# Patient Record
Sex: MALE | Race: WHITE | Employment: OTHER | URBAN - METROPOLITAN AREA
[De-identification: names, ages, dates, MRNs, and addresses within clinical notes are randomized per-mention and may not be internally consistent; named-entity substitution may affect disease eponyms.]

---

## 2017-01-03 ENCOUNTER — TELEPHONE (OUTPATIENT)
Dept: FAMILY MEDICINE CLINIC | Age: 67
End: 2017-01-03

## 2017-01-03 RX ORDER — AZITHROMYCIN 250 MG/1
TABLET, FILM COATED ORAL
Qty: 1 PACKET | Refills: 0 | Status: SHIPPED | OUTPATIENT
Start: 2017-01-03 | End: 2017-01-13

## 2017-01-12 ENCOUNTER — PROCEDURE VISIT (OUTPATIENT)
Dept: FAMILY MEDICINE CLINIC | Age: 67
End: 2017-01-12

## 2017-01-12 VITALS
HEART RATE: 85 BPM | BODY MASS INDEX: 39.17 KG/M2 | WEIGHT: 315 LBS | HEIGHT: 75 IN | OXYGEN SATURATION: 97 % | RESPIRATION RATE: 18 BRPM | SYSTOLIC BLOOD PRESSURE: 130 MMHG | DIASTOLIC BLOOD PRESSURE: 80 MMHG

## 2017-01-12 DIAGNOSIS — R20.9 DISTURBANCE OF SKIN SENSATION: ICD-10-CM

## 2017-01-12 DIAGNOSIS — L98.9 SKIN LESION OF LEFT ARM: ICD-10-CM

## 2017-01-12 DIAGNOSIS — L98.9 SKIN LESION: Primary | ICD-10-CM

## 2017-01-12 DIAGNOSIS — D49.2 NEOPLASM OF UNSPECIFIED BEHAVIOR OF BONE, SOFT TISSUE, AND SKIN: ICD-10-CM

## 2017-01-12 PROCEDURE — 99999 PR OFFICE/OUTPT VISIT,PROCEDURE ONLY: CPT | Performed by: FAMILY MEDICINE

## 2017-01-12 PROCEDURE — 11401 EXC TR-EXT B9+MARG 0.6-1 CM: CPT | Performed by: FAMILY MEDICINE

## 2017-01-12 ASSESSMENT — PATIENT HEALTH QUESTIONNAIRE - PHQ9
SUM OF ALL RESPONSES TO PHQ QUESTIONS 1-9: 0
2. FEELING DOWN, DEPRESSED OR HOPELESS: 0
SUM OF ALL RESPONSES TO PHQ9 QUESTIONS 1 & 2: 0
1. LITTLE INTEREST OR PLEASURE IN DOING THINGS: 0

## 2017-02-03 ENCOUNTER — NURSE ONLY (OUTPATIENT)
Dept: FAMILY MEDICINE CLINIC | Age: 67
End: 2017-02-03

## 2017-02-03 DIAGNOSIS — Z48.02 VISIT FOR SUTURE REMOVAL: Primary | ICD-10-CM

## 2017-04-04 ENCOUNTER — OFFICE VISIT (OUTPATIENT)
Dept: FAMILY MEDICINE CLINIC | Age: 67
End: 2017-04-04

## 2017-04-04 VITALS
OXYGEN SATURATION: 95 % | SYSTOLIC BLOOD PRESSURE: 136 MMHG | RESPIRATION RATE: 16 BRPM | HEART RATE: 82 BPM | HEIGHT: 75 IN | BODY MASS INDEX: 39.17 KG/M2 | WEIGHT: 315 LBS | DIASTOLIC BLOOD PRESSURE: 78 MMHG

## 2017-04-04 DIAGNOSIS — R35.0 URINE FREQUENCY: ICD-10-CM

## 2017-04-04 DIAGNOSIS — E11.43 TYPE 2 DIABETES MELLITUS WITH DIABETIC AUTONOMIC NEUROPATHY, WITHOUT LONG-TERM CURRENT USE OF INSULIN (HCC): ICD-10-CM

## 2017-04-04 DIAGNOSIS — E11.8 TYPE 2 DIABETES MELLITUS WITH COMPLICATION, UNSPECIFIED LONG TERM INSULIN USE STATUS: Primary | ICD-10-CM

## 2017-04-04 DIAGNOSIS — L03.116 CELLULITIS OF LEFT LEG: ICD-10-CM

## 2017-04-04 LAB — HBA1C MFR BLD: 13.6 %

## 2017-04-04 PROCEDURE — 83036 HEMOGLOBIN GLYCOSYLATED A1C: CPT | Performed by: FAMILY MEDICINE

## 2017-04-04 PROCEDURE — 99214 OFFICE O/P EST MOD 30 MIN: CPT | Performed by: FAMILY MEDICINE

## 2017-04-04 RX ORDER — DOXYCYCLINE HYCLATE 100 MG
100 TABLET ORAL 2 TIMES DAILY
Qty: 20 TABLET | Refills: 0 | Status: SHIPPED | OUTPATIENT
Start: 2017-04-04 | End: 2017-04-14

## 2017-04-21 ENCOUNTER — TELEPHONE (OUTPATIENT)
Dept: FAMILY MEDICINE CLINIC | Age: 67
End: 2017-04-21

## 2017-06-02 ENCOUNTER — TELEPHONE (OUTPATIENT)
Dept: FAMILY MEDICINE CLINIC | Age: 67
End: 2017-06-02

## 2017-06-02 ENCOUNTER — OFFICE VISIT (OUTPATIENT)
Dept: FAMILY MEDICINE CLINIC | Age: 67
End: 2017-06-02

## 2017-06-02 VITALS
HEART RATE: 69 BPM | OXYGEN SATURATION: 97 % | SYSTOLIC BLOOD PRESSURE: 136 MMHG | DIASTOLIC BLOOD PRESSURE: 74 MMHG | RESPIRATION RATE: 18 BRPM | HEIGHT: 75 IN

## 2017-06-02 DIAGNOSIS — I87.2 VENOUS INSUFFICIENCY OF BOTH LOWER EXTREMITIES: ICD-10-CM

## 2017-06-02 PROCEDURE — 99214 OFFICE O/P EST MOD 30 MIN: CPT | Performed by: NURSE PRACTITIONER

## 2017-06-02 RX ORDER — CEPHALEXIN 500 MG/1
500 CAPSULE ORAL 3 TIMES DAILY
Qty: 21 CAPSULE | Refills: 0 | Status: SHIPPED | OUTPATIENT
Start: 2017-06-02 | End: 2017-06-09

## 2017-06-05 ENCOUNTER — HOSPITAL ENCOUNTER (OUTPATIENT)
Dept: WOUND CARE | Age: 67
Discharge: HOME | End: 2017-06-05
Attending: SURGERY | Admitting: SURGERY

## 2017-06-05 VITALS
DIASTOLIC BLOOD PRESSURE: 72 MMHG | BODY MASS INDEX: 39.17 KG/M2 | RESPIRATION RATE: 17 BRPM | HEIGHT: 75 IN | WEIGHT: 315 LBS | SYSTOLIC BLOOD PRESSURE: 175 MMHG | HEART RATE: 6 BPM

## 2017-06-05 DIAGNOSIS — L97.929 CHRONIC VENOUS HYPERTENSION (IDIOPATHIC) WITH ULCER AND INFLAMMATION OF LEFT LOWER EXTREMITY (HCC): ICD-10-CM

## 2017-06-05 DIAGNOSIS — I87.2 VENOUS INSUFFICIENCY: Primary | ICD-10-CM

## 2017-06-05 DIAGNOSIS — I87.332 CHRONIC VENOUS HYPERTENSION (IDIOPATHIC) WITH ULCER AND INFLAMMATION OF LEFT LOWER EXTREMITY (HCC): ICD-10-CM

## 2017-06-05 PROCEDURE — 11042 DBRDMT SUBQ TIS 1ST 20SQCM/<: CPT | Performed by: SURGERY

## 2017-06-05 PROCEDURE — 11045 DBRDMT SUBQ TISS EACH ADDL: CPT | Performed by: SURGERY

## 2017-06-05 RX ORDER — LIDOCAINE HYDROCHLORIDE 40 MG/ML
SOLUTION TOPICAL ONCE
Status: DISCONTINUED | OUTPATIENT
Start: 2017-06-05 | End: 2017-06-06 | Stop reason: HOSPADM

## 2017-06-09 ENCOUNTER — TELEPHONE (OUTPATIENT)
Dept: FAMILY MEDICINE CLINIC | Age: 67
End: 2017-06-09

## 2017-06-15 ENCOUNTER — HOSPITAL ENCOUNTER (OUTPATIENT)
Dept: WOUND CARE | Age: 67
Discharge: HOME | End: 2017-06-15
Attending: FAMILY MEDICINE | Admitting: FAMILY MEDICINE

## 2017-06-15 PROCEDURE — 29580 STRAPPING UNNA BOOT: CPT | Performed by: FAMILY MEDICINE

## 2017-06-19 ENCOUNTER — TELEPHONE (OUTPATIENT)
Dept: FAMILY MEDICINE CLINIC | Age: 67
End: 2017-06-19

## 2017-06-22 ENCOUNTER — HOSPITAL ENCOUNTER (OUTPATIENT)
Dept: VASCULAR LAB | Age: 67
Discharge: HOME | End: 2017-06-22
Attending: SURGERY | Admitting: SURGERY

## 2017-06-22 ENCOUNTER — HOSPITAL ENCOUNTER (OUTPATIENT)
Dept: WOUND CARE | Age: 67
Discharge: HOME | End: 2017-06-22
Attending: FAMILY MEDICINE | Admitting: FAMILY MEDICINE

## 2017-06-22 VITALS — SYSTOLIC BLOOD PRESSURE: 163 MMHG | RESPIRATION RATE: 17 BRPM | HEART RATE: 69 BPM | DIASTOLIC BLOOD PRESSURE: 71 MMHG

## 2017-06-22 DIAGNOSIS — I87.2 VENOUS INSUFFICIENCY (CHRONIC) (PERIPHERAL): ICD-10-CM

## 2017-06-22 DIAGNOSIS — I87.2 VENOUS INSUFFICIENCY: ICD-10-CM

## 2017-06-22 PROCEDURE — 11042 DBRDMT SUBQ TIS 1ST 20SQCM/<: CPT | Performed by: FAMILY MEDICINE

## 2017-06-22 RX ORDER — LIDOCAINE HYDROCHLORIDE 40 MG/ML
SOLUTION TOPICAL ONCE
Status: DISCONTINUED | OUTPATIENT
Start: 2017-06-22 | End: 2017-06-23 | Stop reason: HOSPADM

## 2017-06-22 ASSESSMENT — PAIN DESCRIPTION - LOCATION: LOCATION: LEG

## 2017-06-22 ASSESSMENT — PAIN SCALES - GENERAL: PAINLEVEL_OUTOF10: 9

## 2017-06-22 ASSESSMENT — PAIN DESCRIPTION - PAIN TYPE: TYPE: CHRONIC PAIN

## 2017-06-22 ASSESSMENT — PAIN DESCRIPTION - DESCRIPTORS: DESCRIPTORS: BURNING

## 2017-06-22 ASSESSMENT — PAIN DESCRIPTION - ORIENTATION: ORIENTATION: LEFT

## 2017-06-28 ENCOUNTER — HOSPITAL ENCOUNTER (OUTPATIENT)
Dept: WOUND CARE | Age: 67
Discharge: HOME | End: 2017-06-28
Attending: SURGERY | Admitting: FAMILY MEDICINE

## 2017-06-28 VITALS — DIASTOLIC BLOOD PRESSURE: 73 MMHG | HEART RATE: 68 BPM | SYSTOLIC BLOOD PRESSURE: 166 MMHG

## 2017-06-28 DIAGNOSIS — I87.2 VENOUS INSUFFICIENCY (CHRONIC) (PERIPHERAL): ICD-10-CM

## 2017-06-28 PROCEDURE — 11042 DBRDMT SUBQ TIS 1ST 20SQCM/<: CPT | Performed by: SURGERY

## 2017-06-28 RX ORDER — LIDOCAINE HYDROCHLORIDE 40 MG/ML
SOLUTION TOPICAL ONCE
Status: DISCONTINUED | OUTPATIENT
Start: 2017-06-28 | End: 2017-06-29 | Stop reason: HOSPADM

## 2017-06-28 ASSESSMENT — PAIN SCALES - GENERAL: PAINLEVEL_OUTOF10: 9

## 2017-06-28 ASSESSMENT — PAIN DESCRIPTION - PAIN TYPE: TYPE: CHRONIC PAIN

## 2017-06-28 ASSESSMENT — PAIN DESCRIPTION - DESCRIPTORS: DESCRIPTORS: ACHING

## 2017-06-28 ASSESSMENT — PAIN DESCRIPTION - ORIENTATION: ORIENTATION: LEFT

## 2017-06-28 ASSESSMENT — PAIN DESCRIPTION - ONSET: ONSET: ON-GOING

## 2017-07-03 ENCOUNTER — HOSPITAL ENCOUNTER (OUTPATIENT)
Dept: WOUND CARE | Age: 67
Discharge: HOME | End: 2017-07-03
Attending: SURGERY | Admitting: SURGERY

## 2017-07-03 VITALS — RESPIRATION RATE: 18 BRPM | DIASTOLIC BLOOD PRESSURE: 80 MMHG | SYSTOLIC BLOOD PRESSURE: 151 MMHG | HEART RATE: 74 BPM

## 2017-07-03 DIAGNOSIS — I87.332 CHRONIC VENOUS HYPERTENSION (IDIOPATHIC) WITH ULCER AND INFLAMMATION OF LEFT LOWER EXTREMITY (HCC): ICD-10-CM

## 2017-07-03 DIAGNOSIS — L97.929 CHRONIC VENOUS HYPERTENSION (IDIOPATHIC) WITH ULCER AND INFLAMMATION OF LEFT LOWER EXTREMITY (HCC): ICD-10-CM

## 2017-07-03 PROCEDURE — 11042 DBRDMT SUBQ TIS 1ST 20SQCM/<: CPT | Performed by: SURGERY

## 2017-07-03 RX ORDER — LIDOCAINE HYDROCHLORIDE 40 MG/ML
SOLUTION TOPICAL ONCE
Status: DISCONTINUED | OUTPATIENT
Start: 2017-07-03 | End: 2017-07-04 | Stop reason: HOSPADM

## 2017-07-06 ENCOUNTER — OFFICE VISIT (OUTPATIENT)
Dept: FAMILY MEDICINE CLINIC | Age: 67
End: 2017-07-06

## 2017-07-06 VITALS
SYSTOLIC BLOOD PRESSURE: 122 MMHG | DIASTOLIC BLOOD PRESSURE: 74 MMHG | RESPIRATION RATE: 11 BRPM | BODY MASS INDEX: 39.17 KG/M2 | WEIGHT: 315 LBS | HEIGHT: 75 IN

## 2017-07-06 DIAGNOSIS — M25.511 BILATERAL SHOULDER PAIN, UNSPECIFIED CHRONICITY: ICD-10-CM

## 2017-07-06 DIAGNOSIS — M79.604 BILATERAL LEG PAIN: ICD-10-CM

## 2017-07-06 DIAGNOSIS — M54.2 NECK PAIN: ICD-10-CM

## 2017-07-06 DIAGNOSIS — M25.512 BILATERAL SHOULDER PAIN, UNSPECIFIED CHRONICITY: ICD-10-CM

## 2017-07-06 DIAGNOSIS — N39.498 OTHER URINARY INCONTINENCE: ICD-10-CM

## 2017-07-06 DIAGNOSIS — M79.605 BILATERAL LEG PAIN: ICD-10-CM

## 2017-07-06 DIAGNOSIS — E66.9 DIABETES MELLITUS TYPE 2 IN OBESE (HCC): Primary | ICD-10-CM

## 2017-07-06 DIAGNOSIS — E11.69 DIABETES MELLITUS TYPE 2 IN OBESE (HCC): Primary | ICD-10-CM

## 2017-07-06 DIAGNOSIS — I87.2 VENOUS INSUFFICIENCY: ICD-10-CM

## 2017-07-06 DIAGNOSIS — R20.0 NUMBNESS IN BOTH HANDS: ICD-10-CM

## 2017-07-06 DIAGNOSIS — M17.10 ARTHRITIS OF KNEE: ICD-10-CM

## 2017-07-06 DIAGNOSIS — I87.2 VENOUS STASIS DERMATITIS OF BOTH LOWER EXTREMITIES: ICD-10-CM

## 2017-07-06 LAB — HBA1C MFR BLD: 11.4 %

## 2017-07-06 PROCEDURE — 83036 HEMOGLOBIN GLYCOSYLATED A1C: CPT | Performed by: FAMILY MEDICINE

## 2017-07-06 PROCEDURE — 99214 OFFICE O/P EST MOD 30 MIN: CPT | Performed by: FAMILY MEDICINE

## 2017-07-06 RX ORDER — PRAVASTATIN SODIUM 20 MG
20 TABLET ORAL EVERY EVENING
Qty: 30 TABLET | Refills: 5 | Status: SHIPPED | OUTPATIENT
Start: 2017-07-06 | End: 2017-10-31 | Stop reason: SDUPTHER

## 2017-07-06 RX ORDER — TIZANIDINE 4 MG/1
4 TABLET ORAL NIGHTLY PRN
Qty: 30 TABLET | Refills: 5 | Status: SHIPPED | OUTPATIENT
Start: 2017-07-06 | End: 2018-07-10 | Stop reason: SDUPTHER

## 2017-07-07 LAB
CREATININE URINE: 118.1 MG/DL (ref 39–259)
MICROALBUMIN UR-MCNC: 8.3 MG/DL
MICROALBUMIN/CREAT UR-RTO: 70.3 MG/G (ref 0–30)

## 2017-07-08 ENCOUNTER — OFFICE VISIT (OUTPATIENT)
Dept: ORTHOPEDIC SURGERY | Age: 67
End: 2017-07-08

## 2017-07-08 VITALS
DIASTOLIC BLOOD PRESSURE: 64 MMHG | SYSTOLIC BLOOD PRESSURE: 147 MMHG | HEART RATE: 60 BPM | WEIGHT: 315 LBS | BODY MASS INDEX: 39.17 KG/M2 | HEIGHT: 75 IN

## 2017-07-08 DIAGNOSIS — M75.82 ROTATOR CUFF TENDINITIS, LEFT: ICD-10-CM

## 2017-07-08 DIAGNOSIS — M25.512 CHRONIC LEFT SHOULDER PAIN: Primary | ICD-10-CM

## 2017-07-08 DIAGNOSIS — G89.29 CHRONIC LEFT SHOULDER PAIN: Primary | ICD-10-CM

## 2017-07-08 PROBLEM — M75.80 ROTATOR CUFF TENDINITIS: Status: ACTIVE | Noted: 2017-07-08

## 2017-07-08 PROCEDURE — 73030 X-RAY EXAM OF SHOULDER: CPT | Performed by: PHYSICIAN ASSISTANT

## 2017-07-08 PROCEDURE — 99213 OFFICE O/P EST LOW 20 MIN: CPT | Performed by: PHYSICIAN ASSISTANT

## 2017-07-10 ENCOUNTER — HOSPITAL ENCOUNTER (OUTPATIENT)
Dept: WOUND CARE | Age: 67
Discharge: HOME | End: 2017-07-10
Attending: SURGERY | Admitting: SURGERY

## 2017-07-10 VITALS — SYSTOLIC BLOOD PRESSURE: 183 MMHG | DIASTOLIC BLOOD PRESSURE: 79 MMHG | RESPIRATION RATE: 17 BRPM | HEART RATE: 68 BPM

## 2017-07-10 DIAGNOSIS — I87.332 CHRONIC VENOUS HYPERTENSION (IDIOPATHIC) WITH ULCER AND INFLAMMATION OF LEFT LOWER EXTREMITY (HCC): Primary | ICD-10-CM

## 2017-07-10 DIAGNOSIS — L97.929 CHRONIC VENOUS HYPERTENSION (IDIOPATHIC) WITH ULCER AND INFLAMMATION OF LEFT LOWER EXTREMITY (HCC): Primary | ICD-10-CM

## 2017-07-10 PROCEDURE — 11042 DBRDMT SUBQ TIS 1ST 20SQCM/<: CPT | Performed by: SURGERY

## 2017-07-10 ASSESSMENT — PAIN SCALES - GENERAL: PAINLEVEL_OUTOF10: 10

## 2017-07-10 ASSESSMENT — PAIN DESCRIPTION - LOCATION: LOCATION: GENERALIZED

## 2017-07-10 ASSESSMENT — PAIN DESCRIPTION - PAIN TYPE: TYPE: CHRONIC PAIN

## 2017-07-14 ENCOUNTER — HOSPITAL ENCOUNTER (OUTPATIENT)
Dept: NEUROLOGY | Age: 67
Discharge: HOME | End: 2017-07-14
Attending: FAMILY MEDICINE | Admitting: FAMILY MEDICINE

## 2017-07-14 DIAGNOSIS — R20.0 ANESTHESIA OF SKIN: ICD-10-CM

## 2017-07-17 ENCOUNTER — HOSPITAL ENCOUNTER (OUTPATIENT)
Dept: WOUND CARE | Age: 67
Discharge: HOME | End: 2017-07-17
Attending: SURGERY | Admitting: SURGERY

## 2017-07-21 ENCOUNTER — TELEPHONE (OUTPATIENT)
Dept: FAMILY MEDICINE CLINIC | Age: 67
End: 2017-07-21

## 2017-07-24 ENCOUNTER — HOSPITAL ENCOUNTER (OUTPATIENT)
Dept: WOUND CARE | Age: 67
Discharge: HOME | End: 2017-07-24
Attending: SURGERY | Admitting: SURGERY

## 2017-07-24 VITALS — DIASTOLIC BLOOD PRESSURE: 68 MMHG | SYSTOLIC BLOOD PRESSURE: 153 MMHG | HEART RATE: 65 BPM

## 2017-07-24 DIAGNOSIS — I87.332 CHRONIC VENOUS HYPERTENSION (IDIOPATHIC) WITH ULCER AND INFLAMMATION OF LEFT LOWER EXTREMITY (HCC): Primary | ICD-10-CM

## 2017-07-24 DIAGNOSIS — L97.929 CHRONIC VENOUS HYPERTENSION (IDIOPATHIC) WITH ULCER AND INFLAMMATION OF LEFT LOWER EXTREMITY (HCC): Primary | ICD-10-CM

## 2017-07-24 PROCEDURE — 99213 OFFICE O/P EST LOW 20 MIN: CPT | Performed by: SURGERY

## 2017-07-28 ENCOUNTER — OFFICE VISIT (OUTPATIENT)
Dept: SURGERY | Age: 67
End: 2017-07-28

## 2017-07-28 VITALS
HEIGHT: 75 IN | DIASTOLIC BLOOD PRESSURE: 70 MMHG | SYSTOLIC BLOOD PRESSURE: 166 MMHG | BODY MASS INDEX: 39.17 KG/M2 | WEIGHT: 315 LBS

## 2017-07-28 DIAGNOSIS — I87.332 CHRONIC VENOUS HYPERTENSION (IDIOPATHIC) WITH ULCER AND INFLAMMATION OF LEFT LOWER EXTREMITY (HCC): Primary | ICD-10-CM

## 2017-07-28 DIAGNOSIS — I89.0 LYMPHEDEMA OF BOTH LOWER EXTREMITIES: ICD-10-CM

## 2017-07-28 DIAGNOSIS — I10 ESSENTIAL HYPERTENSION, BENIGN: ICD-10-CM

## 2017-07-28 DIAGNOSIS — I87.2 VENOUS INSUFFICIENCY (CHRONIC) (PERIPHERAL): ICD-10-CM

## 2017-07-28 DIAGNOSIS — L97.929 CHRONIC VENOUS HYPERTENSION (IDIOPATHIC) WITH ULCER AND INFLAMMATION OF LEFT LOWER EXTREMITY (HCC): Primary | ICD-10-CM

## 2017-07-28 DIAGNOSIS — E08.43 DIABETES MELLITUS DUE TO UNDERLYING CONDITION WITH DIABETIC AUTONOMIC NEUROPATHY, UNSPECIFIED LONG TERM INSULIN USE STATUS: ICD-10-CM

## 2017-07-28 PROBLEM — I87.323 CHRONIC VENOUS HYPERTENSION (IDIOPATHIC) WITH INFLAMMATION OF BILATERAL LOWER EXTREMITY: Status: ACTIVE | Noted: 2017-06-05

## 2017-07-28 PROCEDURE — 99213 OFFICE O/P EST LOW 20 MIN: CPT | Performed by: SURGERY

## 2017-07-28 ASSESSMENT — ENCOUNTER SYMPTOMS
ALLERGIC/IMMUNOLOGIC NEGATIVE: 1
RESPIRATORY NEGATIVE: 1
EYES NEGATIVE: 1
GASTROINTESTINAL NEGATIVE: 1

## 2017-08-21 ENCOUNTER — TELEPHONE (OUTPATIENT)
Dept: SURGERY | Age: 67
End: 2017-08-21

## 2017-09-01 ENCOUNTER — OFFICE VISIT (OUTPATIENT)
Dept: ORTHOPEDIC SURGERY | Age: 67
End: 2017-09-01

## 2017-09-01 VITALS — HEIGHT: 75 IN | BODY MASS INDEX: 39.17 KG/M2 | WEIGHT: 315 LBS

## 2017-09-01 DIAGNOSIS — M54.12 CERVICAL RADICULITIS: Primary | ICD-10-CM

## 2017-09-01 PROCEDURE — 99214 OFFICE O/P EST MOD 30 MIN: CPT | Performed by: NURSE PRACTITIONER

## 2017-09-08 ENCOUNTER — HOSPITAL ENCOUNTER (OUTPATIENT)
Dept: MRI IMAGING | Age: 67
Discharge: HOME | End: 2017-09-08
Attending: NURSE PRACTITIONER | Admitting: NURSE PRACTITIONER

## 2017-09-08 DIAGNOSIS — M54.12 RADICULOPATHY OF CERVICAL REGION: ICD-10-CM

## 2017-09-08 DIAGNOSIS — M54.12 CERVICAL RADICULITIS: ICD-10-CM

## 2017-09-11 ENCOUNTER — TELEPHONE (OUTPATIENT)
Dept: ORTHOPEDIC SURGERY | Age: 67
End: 2017-09-11

## 2017-09-22 ENCOUNTER — OFFICE VISIT (OUTPATIENT)
Dept: ORTHOPEDIC SURGERY | Age: 67
End: 2017-09-22

## 2017-09-22 VITALS
SYSTOLIC BLOOD PRESSURE: 172 MMHG | HEART RATE: 60 BPM | BODY MASS INDEX: 39.17 KG/M2 | HEIGHT: 75 IN | DIASTOLIC BLOOD PRESSURE: 57 MMHG | WEIGHT: 315 LBS | TEMPERATURE: 98.2 F

## 2017-09-22 DIAGNOSIS — E66.09 EXOGENOUS OBESITY: Primary | ICD-10-CM

## 2017-09-22 DIAGNOSIS — G95.9 CERVICAL MYELOPATHY (HCC): ICD-10-CM

## 2017-09-22 PROBLEM — M54.12 CERVICAL RADICULITIS: Status: ACTIVE | Noted: 2017-09-22

## 2017-09-22 PROCEDURE — 99214 OFFICE O/P EST MOD 30 MIN: CPT | Performed by: ORTHOPAEDIC SURGERY

## 2017-10-03 ENCOUNTER — TELEPHONE (OUTPATIENT)
Dept: SURGERY | Age: 67
End: 2017-10-03

## 2017-10-03 NOTE — TELEPHONE ENCOUNTER
Ritesh Canada from Dr. Anne Mojica office called regarding a backdated [employer] Referral for this patient. [facility] has changed their referral process (once again). Referrals for this patient can be submitted by the Specialist by calling [phone]. They are not done by the PCP office any longer.

## 2017-10-31 ENCOUNTER — OFFICE VISIT (OUTPATIENT)
Dept: FAMILY MEDICINE CLINIC | Age: 67
End: 2017-10-31

## 2017-10-31 VITALS
HEIGHT: 75 IN | HEART RATE: 82 BPM | RESPIRATION RATE: 20 BRPM | WEIGHT: 315 LBS | BODY MASS INDEX: 39.17 KG/M2 | SYSTOLIC BLOOD PRESSURE: 116 MMHG | DIASTOLIC BLOOD PRESSURE: 74 MMHG

## 2017-10-31 DIAGNOSIS — E11.8 TYPE 2 DIABETES MELLITUS WITH COMPLICATION, UNSPECIFIED LONG TERM INSULIN USE STATUS: Primary | ICD-10-CM

## 2017-10-31 DIAGNOSIS — M50.30 DDD (DEGENERATIVE DISC DISEASE), CERVICAL: ICD-10-CM

## 2017-10-31 DIAGNOSIS — E78.00 HYPERCHOLESTEREMIA: ICD-10-CM

## 2017-10-31 DIAGNOSIS — L98.491 CHRONIC SKIN ULCER, LIMITED TO BREAKDOWN OF SKIN (HCC): ICD-10-CM

## 2017-10-31 DIAGNOSIS — I87.2 VENOUS INSUFFICIENCY OF BOTH LOWER EXTREMITIES: ICD-10-CM

## 2017-10-31 DIAGNOSIS — G95.9 CERVICAL MYELOPATHY (HCC): ICD-10-CM

## 2017-10-31 DIAGNOSIS — E11.42 DIABETIC POLYNEUROPATHY ASSOCIATED WITH TYPE 2 DIABETES MELLITUS (HCC): ICD-10-CM

## 2017-10-31 LAB — HBA1C MFR BLD: 10.5 %

## 2017-10-31 PROCEDURE — 1036F TOBACCO NON-USER: CPT | Performed by: FAMILY MEDICINE

## 2017-10-31 PROCEDURE — 1123F ACP DISCUSS/DSCN MKR DOCD: CPT | Performed by: FAMILY MEDICINE

## 2017-10-31 PROCEDURE — 99214 OFFICE O/P EST MOD 30 MIN: CPT | Performed by: FAMILY MEDICINE

## 2017-10-31 PROCEDURE — G8484 FLU IMMUNIZE NO ADMIN: HCPCS | Performed by: FAMILY MEDICINE

## 2017-10-31 PROCEDURE — 83036 HEMOGLOBIN GLYCOSYLATED A1C: CPT | Performed by: FAMILY MEDICINE

## 2017-10-31 PROCEDURE — 4040F PNEUMOC VAC/ADMIN/RCVD: CPT | Performed by: FAMILY MEDICINE

## 2017-10-31 PROCEDURE — 3017F COLORECTAL CA SCREEN DOC REV: CPT | Performed by: FAMILY MEDICINE

## 2017-10-31 PROCEDURE — G8417 CALC BMI ABV UP PARAM F/U: HCPCS | Performed by: FAMILY MEDICINE

## 2017-10-31 PROCEDURE — 3046F HEMOGLOBIN A1C LEVEL >9.0%: CPT | Performed by: FAMILY MEDICINE

## 2017-10-31 PROCEDURE — G8427 DOCREV CUR MEDS BY ELIG CLIN: HCPCS | Performed by: FAMILY MEDICINE

## 2017-10-31 RX ORDER — PRAVASTATIN SODIUM 20 MG
20 TABLET ORAL EVERY EVENING
Qty: 30 TABLET | Refills: 5 | Status: SHIPPED | OUTPATIENT
Start: 2017-10-31 | End: 2018-04-04 | Stop reason: SDUPTHER

## 2017-10-31 NOTE — PROGRESS NOTES
Subjective:      Patient ID: Pastor Gonzales is a 74 y.o. male. HPI  Taking CLA supplement w/ fish oil for dm  Tried taking various dm meds but has side effects to every medication  a1c 10.5%  Diarrhea w/ metformin  Took zanaflex for headache - head spinning, dizzy, hard to keep balance w/ muscle relaxant  Lab Results   Component Value Date    LABA1C 11.4 07/06/2017     Lab Results   Component Value Date    .1 10/18/2015     Got rid of pop for most part -   bs running 250-300 in am typically. Had been taking insulin - humulin 70-30 and tresiba but caused bruising/ reaction at injection site. May need to get injection for knee pain  Sent to  - early signs of myelopathy w/ ddd cervical  No pain in neck presently - - more heat intolerance. Both shoulders aching - hard to get blood from hand - hands feeling tingling all the time. Knows friend who sees   Losing weight  Tried various glp-1, metformin, sitagliptin, couple different sglt2 inhibitors  Wife administering meds to him- wrapping legs for him  BP Readings from Last 3 Encounters:   10/31/17 116/74   09/22/17 (!) 172/57   07/28/17 (!) 166/70     Pulse Readings from Last 3 Encounters:   10/31/17 82   09/22/17 60   07/24/17 65     Drinking lots of water/ urinary frequency  Review of Systems    Objective:   Physical Exam   Constitutional: He appears well-developed. No distress. HENT:   Mouth/Throat: Oropharynx is clear and moist.   Eyes: Conjunctivae are normal. No scleral icterus. Cardiovascular: Regular rhythm and normal heart sounds. Exam reveals no gallop. No murmur heard. Hr around 100   Pulmonary/Chest: Effort normal and breath sounds normal. No respiratory distress. He has no wheezes. He has no rhonchi. He has no rales. Abdominal: Soft. Bowel sounds are normal. He exhibits no distension. There is no tenderness. Musculoskeletal: He exhibits edema (chronic swelling legs). Neurological: He is alert. Skin: Skin is intact. No rash noted. No erythema. Psychiatric: He has a normal mood and affect. Assessment:      1. Type 2 diabetes mellitus with complication, unspecified long term insulin use status (HCC)  POCT glycosylated hemoglobin (Hb A1C)   2. Venous insufficiency of both lower extremities     3. Diabetic polyneuropathy associated with type 2 diabetes mellitus ([facility] [address]     4. DDD (degenerative disc disease), cervical     5. Cervical myelopathy ([facility] [address]     6. Hypercholesteremia             Plan:      utd on eye exams  Sees podiatry regularly for feet  Metformin restart  Call and let me know if if tolerating  Consider adding actos or amaryl  Cont wraps for legs  cla supplement d/w pt  Check on hyalagan for knee arthritis  Tachycardia d/w pt - monitor  Weight loss likely due to high bs    Restart pravastatin encouraged  Struggles w/ compliance  Will need control before surgery.

## 2017-11-17 RX ORDER — LANCETS 30 GAUGE
EACH MISCELLANEOUS
Qty: 100 EACH | Refills: 3 | Status: CANCELLED | OUTPATIENT
Start: 2017-11-17

## 2017-12-11 ENCOUNTER — TELEPHONE (OUTPATIENT)
Dept: FAMILY MEDICINE CLINIC | Age: 67
End: 2017-12-11

## 2017-12-11 RX ORDER — AMOXICILLIN 875 MG/1
875 TABLET, COATED ORAL 2 TIMES DAILY
Qty: 20 TABLET | Refills: 0 | Status: SHIPPED | OUTPATIENT
Start: 2017-12-11 | End: 2017-12-21

## 2018-01-01 ENCOUNTER — HOSPITAL ENCOUNTER (OUTPATIENT)
Age: 68
Discharge: HOME | End: 2018-09-26
Payer: MEDICARE

## 2018-01-01 ENCOUNTER — TELEPHONE (OUTPATIENT)
Dept: FAMILY MEDICINE CLINIC | Age: 68
End: 2018-01-01

## 2018-01-01 ENCOUNTER — TELEPHONE (OUTPATIENT)
Dept: CARDIOLOGY CLINIC | Age: 68
End: 2018-01-01

## 2018-01-01 ENCOUNTER — OFFICE VISIT (OUTPATIENT)
Dept: CARDIOLOGY CLINIC | Age: 68
End: 2018-01-01
Payer: MEDICARE

## 2018-01-01 ENCOUNTER — OFFICE VISIT (OUTPATIENT)
Dept: FAMILY MEDICINE CLINIC | Age: 68
End: 2018-01-01
Payer: MEDICARE

## 2018-01-01 ENCOUNTER — HOSPITAL ENCOUNTER (OUTPATIENT)
Dept: WOUND CARE | Age: 68
Discharge: HOME | End: 2018-09-26
Payer: MEDICARE

## 2018-01-01 ENCOUNTER — HOSPITAL ENCOUNTER (OUTPATIENT)
Dept: WOUND CARE | Age: 68
Discharge: HOME | End: 2018-09-12
Attending: SURGERY | Admitting: SURGERY

## 2018-01-01 ENCOUNTER — HOSPITAL ENCOUNTER (OUTPATIENT)
Dept: WOUND CARE | Age: 68
Discharge: HOME | End: 2018-09-19
Attending: SURGERY | Admitting: SURGERY

## 2018-01-01 ENCOUNTER — HOSPITAL ENCOUNTER (OUTPATIENT)
Dept: WOUND CARE | Age: 68
Discharge: HOME | End: 2018-09-05
Attending: SURGERY | Admitting: SURGERY

## 2018-01-01 ENCOUNTER — HOSPITAL ENCOUNTER (OUTPATIENT)
Dept: WOUND CARE | Age: 68
Discharge: HOME | End: 2018-08-29
Attending: SURGERY | Admitting: SURGERY

## 2018-01-01 VITALS
WEIGHT: 315 LBS | HEART RATE: 64 BPM | RESPIRATION RATE: 20 BRPM | SYSTOLIC BLOOD PRESSURE: 140 MMHG | TEMPERATURE: 97 F | HEART RATE: 73 BPM | SYSTOLIC BLOOD PRESSURE: 161 MMHG | DIASTOLIC BLOOD PRESSURE: 69 MMHG | BODY MASS INDEX: 51.49 KG/M2 | TEMPERATURE: 97.6 F | DIASTOLIC BLOOD PRESSURE: 73 MMHG

## 2018-01-01 VITALS
BODY MASS INDEX: 39.17 KG/M2 | WEIGHT: 315 LBS | HEIGHT: 75 IN | SYSTOLIC BLOOD PRESSURE: 140 MMHG | DIASTOLIC BLOOD PRESSURE: 56 MMHG | HEART RATE: 60 BPM

## 2018-01-01 VITALS
BODY MASS INDEX: 39.17 KG/M2 | SYSTOLIC BLOOD PRESSURE: 138 MMHG | WEIGHT: 315 LBS | DIASTOLIC BLOOD PRESSURE: 84 MMHG | HEIGHT: 75 IN

## 2018-01-01 VITALS
HEART RATE: 76 BPM | SYSTOLIC BLOOD PRESSURE: 165 MMHG | RESPIRATION RATE: 18 BRPM | DIASTOLIC BLOOD PRESSURE: 73 MMHG | TEMPERATURE: 97.9 F

## 2018-01-01 VITALS
DIASTOLIC BLOOD PRESSURE: 86 MMHG | SYSTOLIC BLOOD PRESSURE: 141 MMHG | HEART RATE: 71 BPM | RESPIRATION RATE: 18 BRPM | TEMPERATURE: 97.6 F

## 2018-01-01 VITALS
DIASTOLIC BLOOD PRESSURE: 60 MMHG | HEIGHT: 75 IN | HEART RATE: 68 BPM | SYSTOLIC BLOOD PRESSURE: 128 MMHG | BODY MASS INDEX: 39.17 KG/M2 | WEIGHT: 315 LBS

## 2018-01-01 DIAGNOSIS — G47.33 OSA (OBSTRUCTIVE SLEEP APNEA): ICD-10-CM

## 2018-01-01 DIAGNOSIS — I10 ESSENTIAL HYPERTENSION: ICD-10-CM

## 2018-01-01 DIAGNOSIS — E66.01 MORBID OBESITY (HCC): ICD-10-CM

## 2018-01-01 DIAGNOSIS — R42 DIZZY: ICD-10-CM

## 2018-01-01 DIAGNOSIS — E03.9 ACQUIRED HYPOTHYROIDISM: ICD-10-CM

## 2018-01-01 DIAGNOSIS — E78.2 MIXED HYPERLIPIDEMIA: ICD-10-CM

## 2018-01-01 DIAGNOSIS — E78.2 MIXED HYPERLIPIDEMIA: Primary | ICD-10-CM

## 2018-01-01 DIAGNOSIS — I48.91 ATRIAL FIBRILLATION WITH RVR (HCC): ICD-10-CM

## 2018-01-01 DIAGNOSIS — L97.919 VENOUS STASIS ULCER OF RIGHT LOWER EXTREMITY (HCC): ICD-10-CM

## 2018-01-01 DIAGNOSIS — I48.20 CHRONIC A-FIB (HCC): ICD-10-CM

## 2018-01-01 DIAGNOSIS — I50.22 CHRONIC SYSTOLIC (CONGESTIVE) HEART FAILURE (HCC): ICD-10-CM

## 2018-01-01 DIAGNOSIS — I48.0 PAROXYSMAL ATRIAL FIBRILLATION (HCC): ICD-10-CM

## 2018-01-01 DIAGNOSIS — I50.23 ACUTE ON CHRONIC SYSTOLIC HEART FAILURE (HCC): ICD-10-CM

## 2018-01-01 DIAGNOSIS — I83.019 VENOUS STASIS ULCER OF RIGHT LOWER EXTREMITY (HCC): ICD-10-CM

## 2018-01-01 DIAGNOSIS — L97.519 ULCER OF TOE OF RIGHT FOOT, UNSPECIFIED ULCER STAGE (HCC): ICD-10-CM

## 2018-01-01 DIAGNOSIS — I50.23 ACUTE ON CHRONIC SYSTOLIC HEART FAILURE (HCC): Primary | ICD-10-CM

## 2018-01-01 LAB
ANION GAP SERPL CALCULATED.3IONS-SCNC: 12 MMOL/L (ref 3–16)
BUN BLDV-MCNC: 22 MG/DL (ref 7–20)
CALCIUM SERPL-MCNC: 9.3 MG/DL (ref 8.3–10.6)
CATARACTS: POSITIVE
CHLORIDE BLD-SCNC: 102 MMOL/L (ref 99–110)
CO2: 26 MMOL/L (ref 21–32)
CREAT SERPL-MCNC: 1 MG/DL (ref 0.8–1.3)
CREATININE URINE POCT: 100
DIABETIC RETINOPATHY: NEGATIVE
GFR AFRICAN AMERICAN: >60
GFR NON-AFRICAN AMERICAN: >60
GLAUCOMA: NEGATIVE
GLUCOSE BLD-MCNC: 299 MG/DL (ref 70–99)
HBA1C MFR BLD: 12.1 %
INTRAOCULAR PRESSURE EYE: NORMAL
MICROALBUMIN/CREAT 24H UR: 80 MG/G{CREAT}
MICROALBUMIN/CREAT UR-RTO: ABNORMAL
POTASSIUM SERPL-SCNC: 4.4 MMOL/L (ref 3.5–5.1)
SODIUM BLD-SCNC: 140 MMOL/L (ref 136–145)
VISUAL ACUITY DISTANCE LEFT EYE: NORMAL
VISUAL ACUITY DISTANCE RIGHT EYE: NORMAL

## 2018-01-01 PROCEDURE — 4040F PNEUMOC VAC/ADMIN/RCVD: CPT | Performed by: NURSE PRACTITIONER

## 2018-01-01 PROCEDURE — 4040F PNEUMOC VAC/ADMIN/RCVD: CPT | Performed by: FAMILY MEDICINE

## 2018-01-01 PROCEDURE — 83036 HEMOGLOBIN GLYCOSYLATED A1C: CPT | Performed by: FAMILY MEDICINE

## 2018-01-01 PROCEDURE — 11042 DBRDMT SUBQ TIS 1ST 20SQCM/<: CPT | Performed by: SURGERY

## 2018-01-01 PROCEDURE — 82044 UR ALBUMIN SEMIQUANTITATIVE: CPT | Performed by: FAMILY MEDICINE

## 2018-01-01 PROCEDURE — 99214 OFFICE O/P EST MOD 30 MIN: CPT | Performed by: NURSE PRACTITIONER

## 2018-01-01 PROCEDURE — 99214 OFFICE O/P EST MOD 30 MIN: CPT | Performed by: FAMILY MEDICINE

## 2018-01-01 PROCEDURE — 1101F PT FALLS ASSESS-DOCD LE1/YR: CPT | Performed by: FAMILY MEDICINE

## 2018-01-01 PROCEDURE — G8427 DOCREV CUR MEDS BY ELIG CLIN: HCPCS | Performed by: NURSE PRACTITIONER

## 2018-01-01 PROCEDURE — 99213 OFFICE O/P EST LOW 20 MIN: CPT

## 2018-01-01 PROCEDURE — G8427 DOCREV CUR MEDS BY ELIG CLIN: HCPCS | Performed by: FAMILY MEDICINE

## 2018-01-01 PROCEDURE — 1101F PT FALLS ASSESS-DOCD LE1/YR: CPT | Performed by: NURSE PRACTITIONER

## 2018-01-01 PROCEDURE — 2022F DILAT RTA XM EVC RTNOPTHY: CPT | Performed by: FAMILY MEDICINE

## 2018-01-01 PROCEDURE — G8417 CALC BMI ABV UP PARAM F/U: HCPCS | Performed by: NURSE PRACTITIONER

## 2018-01-01 PROCEDURE — 3046F HEMOGLOBIN A1C LEVEL >9.0%: CPT | Performed by: FAMILY MEDICINE

## 2018-01-01 PROCEDURE — 1036F TOBACCO NON-USER: CPT | Performed by: NURSE PRACTITIONER

## 2018-01-01 PROCEDURE — 1123F ACP DISCUSS/DSCN MKR DOCD: CPT | Performed by: FAMILY MEDICINE

## 2018-01-01 PROCEDURE — 99212 OFFICE O/P EST SF 10 MIN: CPT | Performed by: SURGERY

## 2018-01-01 PROCEDURE — 1123F ACP DISCUSS/DSCN MKR DOCD: CPT | Performed by: NURSE PRACTITIONER

## 2018-01-01 PROCEDURE — G8484 FLU IMMUNIZE NO ADMIN: HCPCS | Performed by: NURSE PRACTITIONER

## 2018-01-01 PROCEDURE — 3017F COLORECTAL CA SCREEN DOC REV: CPT | Performed by: FAMILY MEDICINE

## 2018-01-01 PROCEDURE — G8484 FLU IMMUNIZE NO ADMIN: HCPCS | Performed by: FAMILY MEDICINE

## 2018-01-01 PROCEDURE — 80048 BASIC METABOLIC PNL TOTAL CA: CPT

## 2018-01-01 PROCEDURE — 3017F COLORECTAL CA SCREEN DOC REV: CPT | Performed by: NURSE PRACTITIONER

## 2018-01-01 PROCEDURE — G8417 CALC BMI ABV UP PARAM F/U: HCPCS | Performed by: FAMILY MEDICINE

## 2018-01-01 PROCEDURE — 1036F TOBACCO NON-USER: CPT | Performed by: FAMILY MEDICINE

## 2018-01-01 RX ORDER — LISINOPRIL 20 MG/1
40 TABLET ORAL DAILY
Qty: 90 TABLET | Refills: 3
Start: 2018-01-01

## 2018-01-01 RX ORDER — LISINOPRIL 20 MG/1
20 TABLET ORAL DAILY
Qty: 90 TABLET | Refills: 3 | Status: SHIPPED | OUTPATIENT
Start: 2018-01-01 | End: 2018-01-01 | Stop reason: SDUPTHER

## 2018-01-26 ENCOUNTER — OFFICE VISIT (OUTPATIENT)
Dept: FAMILY MEDICINE CLINIC | Age: 68
End: 2018-01-26

## 2018-01-26 VITALS
HEART RATE: 84 BPM | SYSTOLIC BLOOD PRESSURE: 132 MMHG | RESPIRATION RATE: 20 BRPM | WEIGHT: 315 LBS | BODY MASS INDEX: 39.17 KG/M2 | HEIGHT: 75 IN | DIASTOLIC BLOOD PRESSURE: 78 MMHG

## 2018-01-26 DIAGNOSIS — R39.15 URINARY URGENCY: ICD-10-CM

## 2018-01-26 DIAGNOSIS — L97.901 ULCER OF LOWER EXTREMITY, LIMITED TO BREAKDOWN OF SKIN, UNSPECIFIED LATERALITY (HCC): ICD-10-CM

## 2018-01-26 DIAGNOSIS — L97.921 ULCER OF LEFT LOWER EXTREMITY, LIMITED TO BREAKDOWN OF SKIN (HCC): ICD-10-CM

## 2018-01-26 DIAGNOSIS — E66.01 MORBID OBESITY WITH BMI OF 50.0-59.9, ADULT (HCC): ICD-10-CM

## 2018-01-26 DIAGNOSIS — M19.90 ARTHRITIS: Primary | ICD-10-CM

## 2018-01-26 DIAGNOSIS — I87.2 VENOUS INSUFFICIENCY: ICD-10-CM

## 2018-01-26 DIAGNOSIS — E11.42 DIABETIC POLYNEUROPATHY ASSOCIATED WITH TYPE 2 DIABETES MELLITUS (HCC): ICD-10-CM

## 2018-01-26 DIAGNOSIS — I87.2 VENOUS STASIS DERMATITIS OF BOTH LOWER EXTREMITIES: ICD-10-CM

## 2018-01-26 DIAGNOSIS — E11.69 DIABETES MELLITUS TYPE 2 IN OBESE (HCC): ICD-10-CM

## 2018-01-26 DIAGNOSIS — E66.9 DIABETES MELLITUS TYPE 2 IN OBESE (HCC): ICD-10-CM

## 2018-01-26 LAB
BILIRUBIN, POC: NORMAL
BLOOD URINE, POC: NORMAL
CLARITY, POC: CLEAR
COLOR, POC: YELLOW
GLUCOSE URINE, POC: NORMAL
HBA1C MFR BLD: 11.1 %
KETONES, POC: NORMAL
LEUKOCYTE EST, POC: NORMAL
NITRITE, POC: NORMAL
PH, POC: 5.5
PROTEIN, POC: NORMAL
SPECIFIC GRAVITY, POC: 1.02
UROBILINOGEN, POC: 0.2

## 2018-01-26 PROCEDURE — 83036 HEMOGLOBIN GLYCOSYLATED A1C: CPT | Performed by: FAMILY MEDICINE

## 2018-01-26 PROCEDURE — G8427 DOCREV CUR MEDS BY ELIG CLIN: HCPCS | Performed by: FAMILY MEDICINE

## 2018-01-26 PROCEDURE — 1123F ACP DISCUSS/DSCN MKR DOCD: CPT | Performed by: FAMILY MEDICINE

## 2018-01-26 PROCEDURE — 81002 URINALYSIS NONAUTO W/O SCOPE: CPT | Performed by: FAMILY MEDICINE

## 2018-01-26 PROCEDURE — 3017F COLORECTAL CA SCREEN DOC REV: CPT | Performed by: FAMILY MEDICINE

## 2018-01-26 PROCEDURE — G8417 CALC BMI ABV UP PARAM F/U: HCPCS | Performed by: FAMILY MEDICINE

## 2018-01-26 PROCEDURE — 3046F HEMOGLOBIN A1C LEVEL >9.0%: CPT | Performed by: FAMILY MEDICINE

## 2018-01-26 PROCEDURE — 4040F PNEUMOC VAC/ADMIN/RCVD: CPT | Performed by: FAMILY MEDICINE

## 2018-01-26 PROCEDURE — G8484 FLU IMMUNIZE NO ADMIN: HCPCS | Performed by: FAMILY MEDICINE

## 2018-01-26 PROCEDURE — 1036F TOBACCO NON-USER: CPT | Performed by: FAMILY MEDICINE

## 2018-01-26 PROCEDURE — 99214 OFFICE O/P EST MOD 30 MIN: CPT | Performed by: FAMILY MEDICINE

## 2018-01-26 RX ORDER — TAMSULOSIN HYDROCHLORIDE 0.4 MG/1
0.4 CAPSULE ORAL DAILY
Qty: 30 CAPSULE | Refills: 5 | Status: SHIPPED | OUTPATIENT
Start: 2018-01-26 | End: 2018-06-01 | Stop reason: SDUPTHER

## 2018-01-26 RX ORDER — MELOXICAM 15 MG/1
15 TABLET ORAL DAILY
Qty: 30 TABLET | Refills: 5 | Status: ON HOLD | OUTPATIENT
Start: 2018-01-26 | End: 2018-05-26 | Stop reason: HOSPADM

## 2018-01-26 ASSESSMENT — PATIENT HEALTH QUESTIONNAIRE - PHQ9
1. LITTLE INTEREST OR PLEASURE IN DOING THINGS: 0
SUM OF ALL RESPONSES TO PHQ QUESTIONS 1-9: 0
2. FEELING DOWN, DEPRESSED OR HOPELESS: 0
SUM OF ALL RESPONSES TO PHQ9 QUESTIONS 1 & 2: 0

## 2018-01-26 NOTE — PROGRESS NOTES
Subjective:      Patient ID: Nay Manriquez is a 69 y.o. male. HPI  Chief Complaint   Patient presents with    Diabetes     DM ROUTINE FOLLOW UP AIC GOAL TODAY HE IS NOT TAKING ANY MEDICATIONS LIKE HE SHOULD THE ONLY WAY HE WILL TAKE IT IS IF HIS WIFE HANDS IT TO HIM NO FASTING HE HAD A GLASS OF MILK     Other     NO SHOTS NOT INTERESTED IN COLONOSCOPY      BP Readings from Last 3 Encounters:   01/26/18 132/78   10/31/17 116/74   09/22/17 (!) 172/57     Pulse Readings from Last 3 Encounters:   01/26/18 84   10/31/17 82   09/22/17 60     Wt Readings from Last 3 Encounters:   01/26/18 (!) 412 lb (186.9 kg)   10/31/17 (!) 417 lb (189.1 kg)   09/22/17 (!) 426 lb (193.2 kg)     Current Outpatient Prescriptions   Medication Sig Dispense Refill    glucose blood VI test strips (ASCENSIA AUTODISC VI;ONE TOUCH ULTRA TEST VI) strip Apply 1 each topically 3 times daily 100 strip 11    pravastatin (PRAVACHOL) 20 MG tablet Take 1 tablet by mouth every evening 30 tablet 5    Hylan (HYLAN) 48 MG/6ML injection Inject 6ml each knee x1 96 mg 0    metFORMIN (GLUCOPHAGE) 500 MG tablet Take 1 tablet by mouth 2 times daily (with meals) 60 tablet 5    Insulin Pen Needle (B-D ULTRAFINE III SHORT PEN) 31G X 8 MM MISC Inject 1 each as directed three times daily 100 each 5    Insulin Degludec (TRESIBA FLEXTOUCH) 200 UNIT/ML SOPN 30-50 units daily as directed 9 Pen 2    tiZANidine (ZANAFLEX) 4 MG tablet Take 1 tablet by mouth nightly as needed (muscle spasm) 30 tablet 5    insulin glargine (LANTUS) 100 UNIT/ML injection pen Inject 45 Units into the skin 2 times daily 10 Pen 3    HUMULIN 70/30 KWIKPEN (70-30) 100 UNIT/ML injection pen INJECT 60 UNITS SUBCUTANEOUSLY BEFORE BREAKFAST AND 40 UNITS BEFORE DINNER 15 Pen 0     No current facility-administered medications for this visit. uses insulin occasionally - uses metformin if wife gives it to him.   Last a1c 10.5 10/31/17 - due for annual labs  Eye exam  More urinary incontinence/ more urgency - trouble getting to bathroom. No problems w/ urine stream coming out. Not going as much as in past - hard to get up steps to bathroom - has urinal.  Using urinal 2-4 x/ night. Very strong odor. Drinks a lot of water. Drinks coffee - not drinking as much coke. No erection/ ED problems  No dysuria. Left knee pain/ hands numb more than feet  Seen by eye doctor recently  bilat shoulder pain - limited rom  No gi sx. No recent blisters/ oozing - wrapping legs - wife helping  Review of Systems  Goes out to play [employer] w/ friends weekly  Objective:   Physical Exam   Constitutional: He appears well-developed. No distress. HENT:   Mouth/Throat: Oropharynx is clear and moist.   Eyes: Conjunctivae are normal. No scleral icterus. Cardiovascular: Normal rate, regular rhythm and normal heart sounds. Exam reveals no gallop. No murmur heard. Pulmonary/Chest: Effort normal and breath sounds normal. No respiratory distress. He has no wheezes. He has no rhonchi. He has no rales. Abdominal: Soft. Bowel sounds are normal. He exhibits no distension. There is no tenderness. Musculoskeletal: He exhibits edema (chronic edema legs - wrapped). Neurological: He is alert. Skin: Skin is intact. No rash noted. No erythema. Psychiatric: He has a normal mood and affect. Assessment:      1. Arthritis     2. Diabetes mellitus type 2 in obese (HCC)  POCT glycosylated hemoglobin (Hb A1C)   3. Urinary urgency  POCT Urinalysis no Micro   4. Venous insufficiency     5.  Venous stasis dermatitis of both lower extremities             Plan:      Fasting lipid,cmp,psa, vit d soon  Encourage compliance w/ meds/ diet  Metformin regularly encouraged  Neuropathy may improve w/ lower bs  flomax trial but ? oab  Push water/ less bladder irritants  Check ua  mobic daily prn oa pain    Feet care  utd on eye exams - monitoring cataracts

## 2018-01-31 ENCOUNTER — TELEPHONE (OUTPATIENT)
Dept: FAMILY MEDICINE CLINIC | Age: 68
End: 2018-01-31

## 2018-01-31 DIAGNOSIS — N40.1 BENIGN PROSTATIC HYPERPLASIA WITH URINARY FREQUENCY: ICD-10-CM

## 2018-01-31 DIAGNOSIS — E55.9 VITAMIN D DEFICIENCY: ICD-10-CM

## 2018-01-31 DIAGNOSIS — R35.0 BENIGN PROSTATIC HYPERPLASIA WITH URINARY FREQUENCY: ICD-10-CM

## 2018-01-31 DIAGNOSIS — E11.8 TYPE 2 DIABETES MELLITUS WITH COMPLICATION, UNSPECIFIED LONG TERM INSULIN USE STATUS: ICD-10-CM

## 2018-01-31 DIAGNOSIS — E03.9 ACQUIRED HYPOTHYROIDISM: ICD-10-CM

## 2018-01-31 DIAGNOSIS — E66.01 MORBID OBESITY (HCC): Primary | ICD-10-CM

## 2018-01-31 DIAGNOSIS — Z13.220 LIPID SCREENING: ICD-10-CM

## 2018-02-02 ENCOUNTER — NURSE ONLY (OUTPATIENT)
Dept: FAMILY MEDICINE CLINIC | Age: 68
End: 2018-02-02

## 2018-02-02 DIAGNOSIS — E55.9 VITAMIN D DEFICIENCY: ICD-10-CM

## 2018-02-02 DIAGNOSIS — R35.0 BENIGN PROSTATIC HYPERPLASIA WITH URINARY FREQUENCY: ICD-10-CM

## 2018-02-02 DIAGNOSIS — Z13.220 LIPID SCREENING: ICD-10-CM

## 2018-02-02 DIAGNOSIS — E03.9 ACQUIRED HYPOTHYROIDISM: ICD-10-CM

## 2018-02-02 DIAGNOSIS — E11.8 TYPE 2 DIABETES MELLITUS WITH COMPLICATION, UNSPECIFIED LONG TERM INSULIN USE STATUS: ICD-10-CM

## 2018-02-02 DIAGNOSIS — N40.1 BENIGN PROSTATIC HYPERPLASIA WITH URINARY FREQUENCY: ICD-10-CM

## 2018-02-02 LAB
A/G RATIO: 1.3 (ref 1.1–2.2)
ALBUMIN SERPL-MCNC: 4 G/DL (ref 3.4–5)
ALP BLD-CCNC: 63 U/L (ref 40–129)
ALT SERPL-CCNC: 17 U/L (ref 10–40)
ANION GAP SERPL CALCULATED.3IONS-SCNC: 14 MMOL/L (ref 3–16)
AST SERPL-CCNC: 14 U/L (ref 15–37)
BILIRUB SERPL-MCNC: 0.5 MG/DL (ref 0–1)
BUN BLDV-MCNC: 13 MG/DL (ref 7–20)
CALCIUM SERPL-MCNC: 9.9 MG/DL (ref 8.3–10.6)
CHLORIDE BLD-SCNC: 102 MMOL/L (ref 99–110)
CHOLESTEROL, TOTAL: 165 MG/DL (ref 0–199)
CO2: 28 MMOL/L (ref 21–32)
CREAT SERPL-MCNC: 0.8 MG/DL (ref 0.8–1.3)
GFR AFRICAN AMERICAN: >60
GFR NON-AFRICAN AMERICAN: >60
GLOBULIN: 3 G/DL
GLUCOSE BLD-MCNC: 253 MG/DL (ref 70–99)
HDLC SERPL-MCNC: 60 MG/DL (ref 40–60)
LDL CHOLESTEROL CALCULATED: 82 MG/DL
POTASSIUM SERPL-SCNC: 4.5 MMOL/L (ref 3.5–5.1)
PROSTATE SPECIFIC ANTIGEN: 0.04 NG/ML (ref 0–4)
SODIUM BLD-SCNC: 144 MMOL/L (ref 136–145)
T4 FREE: 1.1 NG/DL (ref 0.9–1.8)
TOTAL PROTEIN: 7 G/DL (ref 6.4–8.2)
TRIGL SERPL-MCNC: 114 MG/DL (ref 0–150)
TSH REFLEX: 6.9 UIU/ML (ref 0.27–4.2)
VITAMIN D 25-HYDROXY: 18.3 NG/ML
VLDLC SERPL CALC-MCNC: 23 MG/DL

## 2018-02-02 PROCEDURE — 36415 COLL VENOUS BLD VENIPUNCTURE: CPT | Performed by: FAMILY MEDICINE

## 2018-02-14 DIAGNOSIS — R60.9 EDEMA, UNSPECIFIED TYPE: Primary | ICD-10-CM

## 2018-02-14 DIAGNOSIS — E11.40 TYPE 2 DIABETES MELLITUS WITH DIABETIC NEUROPATHY, WITHOUT LONG-TERM CURRENT USE OF INSULIN (HCC): ICD-10-CM

## 2018-02-14 RX ORDER — LEVOTHYROXINE SODIUM 0.03 MG/1
25 TABLET ORAL DAILY
Qty: 30 TABLET | Refills: 2 | Status: SHIPPED | OUTPATIENT
Start: 2018-02-14 | End: 2018-06-01 | Stop reason: SDUPTHER

## 2018-05-22 PROBLEM — I48.91 ATRIAL FIBRILLATION WITH RAPID VENTRICULAR RESPONSE (HCC): Status: ACTIVE | Noted: 2018-05-22

## 2018-06-01 ENCOUNTER — OFFICE VISIT (OUTPATIENT)
Dept: CARDIOLOGY CLINIC | Age: 68
End: 2018-06-01

## 2018-06-01 ENCOUNTER — HOSPITAL ENCOUNTER (OUTPATIENT)
Dept: OTHER | Age: 68
Discharge: HOME | End: 2018-06-01
Attending: NURSE PRACTITIONER | Admitting: NURSE PRACTITIONER

## 2018-06-01 ENCOUNTER — OFFICE VISIT (OUTPATIENT)
Dept: FAMILY MEDICINE CLINIC | Age: 68
End: 2018-06-01

## 2018-06-01 VITALS
TEMPERATURE: 98.7 F | BODY MASS INDEX: 39.17 KG/M2 | HEART RATE: 108 BPM | SYSTOLIC BLOOD PRESSURE: 138 MMHG | RESPIRATION RATE: 18 BRPM | HEIGHT: 75 IN | WEIGHT: 315 LBS | OXYGEN SATURATION: 94 % | DIASTOLIC BLOOD PRESSURE: 88 MMHG

## 2018-06-01 VITALS
DIASTOLIC BLOOD PRESSURE: 66 MMHG | SYSTOLIC BLOOD PRESSURE: 96 MMHG | HEIGHT: 75 IN | WEIGHT: 315 LBS | HEART RATE: 74 BPM | BODY MASS INDEX: 39.17 KG/M2

## 2018-06-01 DIAGNOSIS — E11.65 TYPE 2 DIABETES MELLITUS WITH HYPERGLYCEMIA, WITH LONG-TERM CURRENT USE OF INSULIN (HCC): ICD-10-CM

## 2018-06-01 DIAGNOSIS — Z79.4 TYPE 2 DIABETES MELLITUS WITH HYPERGLYCEMIA, WITH LONG-TERM CURRENT USE OF INSULIN (HCC): ICD-10-CM

## 2018-06-01 DIAGNOSIS — E66.01 MORBID OBESITY WITH BMI OF 50.0-59.9, ADULT (HCC): ICD-10-CM

## 2018-06-01 DIAGNOSIS — E78.2 MIXED HYPERLIPIDEMIA: ICD-10-CM

## 2018-06-01 DIAGNOSIS — I48.91 ATRIAL FIBRILLATION WITH RVR (HCC): Primary | ICD-10-CM

## 2018-06-01 DIAGNOSIS — I48.91 ATRIAL FIBRILLATION, UNSPECIFIED TYPE (HCC): Primary | ICD-10-CM

## 2018-06-01 DIAGNOSIS — I50.23 ACUTE ON CHRONIC SYSTOLIC HEART FAILURE (HCC): ICD-10-CM

## 2018-06-01 DIAGNOSIS — G47.33 OSA (OBSTRUCTIVE SLEEP APNEA): ICD-10-CM

## 2018-06-01 LAB
ANION GAP SERPL CALCULATED.3IONS-SCNC: 14 MMOL/L (ref 3–16)
BUN BLDV-MCNC: 20 MG/DL (ref 7–20)
CALCIUM SERPL-MCNC: 9.8 MG/DL (ref 8.3–10.6)
CHLORIDE BLD-SCNC: 98 MMOL/L (ref 99–110)
CO2: 30 MMOL/L (ref 21–32)
CREAT SERPL-MCNC: 1 MG/DL (ref 0.8–1.3)
GFR AFRICAN AMERICAN: >60
GFR NON-AFRICAN AMERICAN: >60
GLUCOSE BLD-MCNC: 164 MG/DL (ref 70–99)
POTASSIUM SERPL-SCNC: 4.2 MMOL/L (ref 3.5–5.1)
SODIUM BLD-SCNC: 142 MMOL/L (ref 136–145)

## 2018-06-01 PROCEDURE — 99214 OFFICE O/P EST MOD 30 MIN: CPT | Performed by: NURSE PRACTITIONER

## 2018-06-01 PROCEDURE — 1123F ACP DISCUSS/DSCN MKR DOCD: CPT | Performed by: NURSE PRACTITIONER

## 2018-06-01 PROCEDURE — G8427 DOCREV CUR MEDS BY ELIG CLIN: HCPCS | Performed by: NURSE PRACTITIONER

## 2018-06-01 PROCEDURE — 3046F HEMOGLOBIN A1C LEVEL >9.0%: CPT | Performed by: NURSE PRACTITIONER

## 2018-06-01 PROCEDURE — 1111F DSCHRG MED/CURRENT MED MERGE: CPT | Performed by: NURSE PRACTITIONER

## 2018-06-01 PROCEDURE — 1036F TOBACCO NON-USER: CPT | Performed by: NURSE PRACTITIONER

## 2018-06-01 PROCEDURE — 4040F PNEUMOC VAC/ADMIN/RCVD: CPT | Performed by: NURSE PRACTITIONER

## 2018-06-01 PROCEDURE — 3017F COLORECTAL CA SCREEN DOC REV: CPT | Performed by: NURSE PRACTITIONER

## 2018-06-01 PROCEDURE — G8417 CALC BMI ABV UP PARAM F/U: HCPCS | Performed by: NURSE PRACTITIONER

## 2018-06-01 PROCEDURE — 2022F DILAT RTA XM EVC RTNOPTHY: CPT | Performed by: NURSE PRACTITIONER

## 2018-06-01 RX ORDER — TAMSULOSIN HYDROCHLORIDE 0.4 MG/1
0.4 CAPSULE ORAL DAILY
Qty: 90 CAPSULE | Refills: 2 | Status: SHIPPED | OUTPATIENT
Start: 2018-06-01

## 2018-06-01 RX ORDER — PRAVASTATIN SODIUM 20 MG
TABLET ORAL
Qty: 90 TABLET | Refills: 3 | Status: SHIPPED | OUTPATIENT
Start: 2018-06-01

## 2018-06-01 RX ORDER — LISINOPRIL 5 MG/1
2.5 TABLET ORAL DAILY
Qty: 30 TABLET | Refills: 3
Start: 2018-06-01 | End: 2018-06-19 | Stop reason: SDUPTHER

## 2018-06-01 RX ORDER — LEVOTHYROXINE SODIUM 0.03 MG/1
25 TABLET ORAL DAILY
Qty: 90 TABLET | Refills: 2 | Status: SHIPPED | OUTPATIENT
Start: 2018-06-01

## 2018-06-01 ASSESSMENT — ENCOUNTER SYMPTOMS
ALLERGIC/IMMUNOLOGIC NEGATIVE: 1
EYES NEGATIVE: 1
GASTROINTESTINAL NEGATIVE: 1
RESPIRATORY NEGATIVE: 1

## 2018-06-02 PROBLEM — E78.2 MIXED HYPERLIPIDEMIA: Status: ACTIVE | Noted: 2018-06-02

## 2018-06-04 ENCOUNTER — TELEPHONE (OUTPATIENT)
Dept: CARDIOLOGY CLINIC | Age: 68
End: 2018-06-04

## 2018-06-19 NOTE — TELEPHONE ENCOUNTER
Patients wife is requesting that these prescriptions be sent to [facility] [email or web address] mail order now  90 days please and would like for the reason he is taking them to be on the label to help her keep them straight  Lisinopril was listed as 2.5 mg daily but Gaston says he is taking the full 5 mg daily    Last OV  6/1/18  Future appointment 8/1/18

## 2018-06-21 RX ORDER — LISINOPRIL 5 MG/1
5 TABLET ORAL DAILY
Qty: 90 TABLET | Refills: 1 | Status: SHIPPED | OUTPATIENT
Start: 2018-06-21 | End: 2018-07-23 | Stop reason: SDUPTHER

## 2018-06-21 RX ORDER — METOPROLOL SUCCINATE 50 MG/1
50 TABLET, EXTENDED RELEASE ORAL DAILY
Qty: 90 TABLET | Refills: 1 | Status: SHIPPED | OUTPATIENT
Start: 2018-06-21

## 2018-06-21 RX ORDER — FUROSEMIDE 40 MG/1
40 TABLET ORAL 2 TIMES DAILY
Qty: 180 TABLET | Refills: 1 | Status: SHIPPED | OUTPATIENT
Start: 2018-06-21

## 2018-07-05 ENCOUNTER — TELEPHONE (OUTPATIENT)
Dept: FAMILY MEDICINE CLINIC | Age: 68
End: 2018-07-05

## 2018-07-05 DIAGNOSIS — S81.801D WOUND OF RIGHT LOWER EXTREMITY, SUBSEQUENT ENCOUNTER: Primary | ICD-10-CM

## 2018-07-05 RX ORDER — SPIRONOLACTONE 25 MG/1
12.5 TABLET ORAL DAILY
Qty: 90 TABLET | Refills: 2 | Status: SHIPPED | OUTPATIENT
Start: 2018-07-05 | End: 2018-01-01 | Stop reason: SINTOL

## 2018-07-05 NOTE — TELEPHONE ENCOUNTER
After speaking with Wound Care  The referral actually needs to go through patients insurance   [facility]   They have the patient scheduled next thursday

## 2018-07-05 NOTE — TELEPHONE ENCOUNTER
Patient needs a new referral to sent to Wound Care at [id number] right leg on the back of his leg,   Let gavin know if patient needs to be seen prior to Referral    ALso needs new 90 day rx sent on these 2 medications to [place] mail order

## 2018-07-10 ENCOUNTER — TELEPHONE (OUTPATIENT)
Dept: FAMILY MEDICINE CLINIC | Age: 68
End: 2018-07-10

## 2018-07-10 RX ORDER — TIZANIDINE 4 MG/1
4 TABLET ORAL EVERY 8 HOURS PRN
Qty: 90 TABLET | Refills: 1 | Status: SHIPPED | OUTPATIENT
Start: 2018-07-10

## 2018-07-12 ENCOUNTER — HOSPITAL ENCOUNTER (OUTPATIENT)
Dept: WOUND CARE | Age: 68
Discharge: HOME | End: 2018-07-12
Attending: INTERNAL MEDICINE | Admitting: INTERNAL MEDICINE

## 2018-07-12 VITALS
WEIGHT: 315 LBS | SYSTOLIC BLOOD PRESSURE: 134 MMHG | DIASTOLIC BLOOD PRESSURE: 51 MMHG | HEART RATE: 57 BPM | BODY MASS INDEX: 55.47 KG/M2 | TEMPERATURE: 98.7 F

## 2018-07-12 PROCEDURE — 99214 OFFICE O/P EST MOD 30 MIN: CPT | Performed by: INTERNAL MEDICINE

## 2018-07-12 PROCEDURE — 11042 DBRDMT SUBQ TIS 1ST 20SQCM/<: CPT | Performed by: INTERNAL MEDICINE

## 2018-07-12 ASSESSMENT — PAIN DESCRIPTION - ONSET: ONSET: ON-GOING

## 2018-07-12 ASSESSMENT — PAIN DESCRIPTION - PAIN TYPE: TYPE: CHRONIC PAIN

## 2018-07-12 ASSESSMENT — PAIN DESCRIPTION - ORIENTATION: ORIENTATION: RIGHT

## 2018-07-12 ASSESSMENT — PAIN SCALES - GENERAL: PAINLEVEL_OUTOF10: 10

## 2018-07-12 ASSESSMENT — PAIN DESCRIPTION - LOCATION: LOCATION: LEG

## 2018-07-12 ASSESSMENT — PAIN DESCRIPTION - DESCRIPTORS: DESCRIPTORS: ACHING

## 2018-07-12 ASSESSMENT — PAIN DESCRIPTION - FREQUENCY: FREQUENCY: CONTINUOUS

## 2018-07-19 ENCOUNTER — HOSPITAL ENCOUNTER (OUTPATIENT)
Dept: WOUND CARE | Age: 68
Discharge: HOME | End: 2018-07-19
Attending: INTERNAL MEDICINE | Admitting: INTERNAL MEDICINE

## 2018-07-19 VITALS
DIASTOLIC BLOOD PRESSURE: 69 MMHG | HEART RATE: 60 BPM | TEMPERATURE: 97.3 F | RESPIRATION RATE: 18 BRPM | SYSTOLIC BLOOD PRESSURE: 157 MMHG

## 2018-07-19 PROCEDURE — 11042 DBRDMT SUBQ TIS 1ST 20SQCM/<: CPT | Performed by: INTERNAL MEDICINE

## 2018-07-19 ASSESSMENT — PAIN DESCRIPTION - PAIN TYPE: TYPE: CHRONIC PAIN

## 2018-07-19 ASSESSMENT — PAIN SCALES - GENERAL: PAINLEVEL_OUTOF10: 10

## 2018-07-19 ASSESSMENT — PAIN DESCRIPTION - LOCATION: LOCATION: LEG

## 2018-07-19 ASSESSMENT — PAIN DESCRIPTION - DESCRIPTORS: DESCRIPTORS: ACHING

## 2018-07-19 ASSESSMENT — PAIN DESCRIPTION - ORIENTATION: ORIENTATION: RIGHT

## 2018-07-19 NOTE — PLAN OF CARE
Problem: Wound:  Goal: Will show signs of wound healing; wound closure and no evidence of infection  Will show signs of wound healing; wound closure and no evidence of infection   Outcome: Ongoing  Discharge instructions given. Patient verbalized understanding. Return to [address] [place] [address],[address] in 1 week.     [] antibiotics    [] X-ray     [] Culture   [x] Debridement      [] HBO Evaluation    [] LABS   [] Vascular Studies []

## 2018-07-19 NOTE — PROGRESS NOTES
extended release tablet Take 1 tablet by mouth daily 90 tablet 1    lisinopril (PRINIVIL;ZESTRIL) 5 MG tablet Take 1 tablet by mouth daily 90 tablet 1    furosemide (LASIX) 40 MG tablet Take 1 tablet by mouth 2 times daily 180 tablet 1    levothyroxine (SYNTHROID) 25 MCG tablet Take 1 tablet by mouth Daily 1 po daily on empty stomach 90 tablet 2    tamsulosin (FLOMAX) 0.4 MG capsule Take 1 capsule by mouth daily 90 capsule 2    pravastatin (PRAVACHOL) 20 MG tablet TAKE ONE TABLET BY MOUTH EVERY EVENING 90 tablet 3    aspirin 81 MG chewable tablet Take 1 tablet by mouth daily 90 tablet 1    insulin glargine (LANTUS) 100 UNIT/ML injection pen Inject 20 Units into the skin 2 times daily (Patient taking differently: Inject 40 Units into the skin nightly ) 5 pen 3    glucose blood VI test strips (ASCENSIA AUTODISC VI;ONE TOUCH ULTRA TEST VI) strip Apply 1 each topically 3 times daily 100 strip 11    Insulin Pen Needle (B-D ULTRAFINE III SHORT PEN) 31G X 8 MM MISC Inject 1 each as directed three times daily 100 each 5    insulin aspart (NOVOLOG FLEXPEN) 100 UNIT/ML injection pen Inject 8 Units into the skin 3 times daily (before meals) 5 pen 2     No current facility-administered medications on file prior to encounter. REVIEW OF SYSTEMS    Pertinent items are noted in HPI.       Objective:      BP (!) 157/69   Pulse 60   Temp 97.3 °F (36.3 °C) (Oral)   Resp 18     PHYSICAL EXAM    General Appearance: alert and oriented to person, place and time, well-developed and well-nourished, in no acute distress  Skin: warm and dry  Head: normocephalic and atraumatic  Eyes: extraocular eye movements intact and conjunctivae normal  Extremities: no cyanosis and no clubbing  Musculoskeletal: normal range of motion, no joint swelling, deformity or tenderness      Assessment:     Patient Active Problem List   Diagnosis    Cough    Acute sinusitis    Allergic rhinitis    Arthropathy    Acute upper respiratory infection    Type 2 diabetes mellitus with complication (HCC)    Hypertrophic and atrophic condition of skin    Acute bronchitis    Other viral warts    Conjunctivitis    Enthesopathy of hip region    Degeneration of intervertebral disc    Essential hypertension    Edema    Leg ulcer, left (HCC)    Acute frontal sinusitis    Hypothyroid    Diabetes type 2, uncontrolled (HCC)    YOKO (obstructive sleep apnea)    Heart murmur    Rotator cuff (capsule) sprain    Venous insufficiency (chronic) (peripheral)    Diabetic cataract (HCC)    BPH (benign prostatic hyperplasia)    Morbid obesity with BMI of 50.0-59.9, adult (HCC)    Risk for falls    Diabetes mellitus with peripheral autonomic neuropathy (HCC)    Subclinical hypothyroidism    Cellulitis    Cellulitis of right lower extremity    Cat scratch    Nonrheumatic aortic valve insufficiency    Meralgia paresthetica    Chronic venous hypertension (idiopathic) with inflammation of bilateral lower extremity    Lymphedema of both lower extremities    Lymphedema    Chronic venous hypertension w ulceration (HCC)    Chronic left shoulder pain    Rotator cuff tendinitis    Cervical radiculitis    Exogenous obesity    Cervical myelopathy (HCC)    Atrial fibrillation with RVR ([facility])    History of noncompliance with medical treatment    Hyperglycemia    Acute on chronic systolic heart failure (HCC)    Mixed hyperlipidemia    Paroxysmal atrial fibrillation ([facility] [address]       Procedure Note    Performed by: Inez Padilla DO    Consent obtained: Yes    Time out taken:  Yes    Pain Control: Anesthetic  Anesthetic: 4% Lidocaine Liquid Topical     Debridement:Excisional Debridement    Using curette the wound was sharply debrided    down through and including the removal of subcutaneous tissue.         Devitalized Tissue Debrided:  slough, necrotic/eschar and exudate    Pre Debridement Measurements:  Are located in the Wound Documentation Flow

## 2018-07-23 ENCOUNTER — OFFICE VISIT (OUTPATIENT)
Dept: CARDIOLOGY CLINIC | Age: 68
End: 2018-07-23

## 2018-07-23 VITALS
HEIGHT: 74 IN | WEIGHT: 315 LBS | SYSTOLIC BLOOD PRESSURE: 130 MMHG | DIASTOLIC BLOOD PRESSURE: 84 MMHG | BODY MASS INDEX: 40.43 KG/M2 | OXYGEN SATURATION: 97 % | HEART RATE: 62 BPM

## 2018-07-23 DIAGNOSIS — E66.01 MORBID OBESITY WITH BMI OF 50.0-59.9, ADULT (HCC): ICD-10-CM

## 2018-07-23 DIAGNOSIS — E78.2 MIXED HYPERLIPIDEMIA: ICD-10-CM

## 2018-07-23 DIAGNOSIS — I48.0 PAROXYSMAL ATRIAL FIBRILLATION (HCC): Primary | ICD-10-CM

## 2018-07-23 DIAGNOSIS — I50.23 ACUTE ON CHRONIC SYSTOLIC HEART FAILURE (HCC): ICD-10-CM

## 2018-07-23 PROCEDURE — 99214 OFFICE O/P EST MOD 30 MIN: CPT | Performed by: NURSE PRACTITIONER

## 2018-07-23 PROCEDURE — 4040F PNEUMOC VAC/ADMIN/RCVD: CPT | Performed by: NURSE PRACTITIONER

## 2018-07-23 PROCEDURE — G8427 DOCREV CUR MEDS BY ELIG CLIN: HCPCS | Performed by: NURSE PRACTITIONER

## 2018-07-23 PROCEDURE — 93000 ELECTROCARDIOGRAM COMPLETE: CPT | Performed by: NURSE PRACTITIONER

## 2018-07-23 PROCEDURE — G8417 CALC BMI ABV UP PARAM F/U: HCPCS | Performed by: NURSE PRACTITIONER

## 2018-07-23 PROCEDURE — 1036F TOBACCO NON-USER: CPT | Performed by: NURSE PRACTITIONER

## 2018-07-23 PROCEDURE — 1101F PT FALLS ASSESS-DOCD LE1/YR: CPT | Performed by: NURSE PRACTITIONER

## 2018-07-23 PROCEDURE — 3017F COLORECTAL CA SCREEN DOC REV: CPT | Performed by: NURSE PRACTITIONER

## 2018-07-23 PROCEDURE — 1123F ACP DISCUSS/DSCN MKR DOCD: CPT | Performed by: NURSE PRACTITIONER

## 2018-07-23 RX ORDER — LISINOPRIL 5 MG/1
10 TABLET ORAL DAILY
Qty: 90 TABLET | Refills: 1
Start: 2018-07-23 | End: 2018-08-23 | Stop reason: DRUGHIGH

## 2018-07-23 NOTE — PROGRESS NOTES
[facility]     Outpatient Follow Up Note    CHIEF COMPLAINT / HPI:Follow Up secondary to atrial fibrillation/ cardiomyopathy/ CHF/ YOKO/ and hyperlipidemia       Ana Mitchell is 69 y.o. male who presents today for a routine follow up related to the above mentioned issues. Subjective:   Since the time of last office visit, the patient admits their symptoms have improved. Patient is being seen OV secondary to atrial fibrillation/ cardiomyopathy/ CHF/ YOKO/ and hyperlipidemia. Patient underwent a successful cardioversion on 6/22/18. At today's OV patient is current in sinus bradycardia. He is on Xarelto 20 mg daily. He denies any chest pain, palpitations, SOB, dizziness, or edema. With regard to medication therapy the patient has been compliant with prescribed regimen. They have tolerated therapy to date.      Past Medical History:   Diagnosis Date    A-fib [facility])     Acute bronchitis 6/8/2011    Acute frontal sinusitis 6/8/2011    Acute upper respiratory infections of unspecified site 6/8/2011    Back pain     CAD (coronary artery disease)     Chronic kidney disease     Degeneration of intervertebral disc, site unspecified 6/8/2011    Diabetes type 2, uncontrolled ([address] 6/21/2011    DJD (degenerative joint disease)     Edema 6/8/2011    legs and feet    Enthesopathy of hip region 6/8/2011    Essential hypertension, benign 6/8/2011    Fracture, ankle     L    Fracture, shoulder     R    Headache(784.0)     Hypercholesteremia     Hypertension     Hyperthyroidism     Hypothyroid 6/21/2011    Knee pain     YOKO (obstructive sleep apnea) 8/30/2011    cpap doesnt use    Other specified viral warts 6/8/2011    Personal history of other (corrected) congenital malformations     R TESTICAL MALFORMED    Type II or unspecified type diabetes mellitus without mention of complication, not stated as uncontrolled     Ulcer of lower limb, unspecified 6/8/2011    Unspecified arthropathy, site CREATININE 1.1 06/22/2018    BUN 20 06/01/2018     06/01/2018    K 4.2 06/01/2018    CL 98 (L) 06/01/2018    CO2 30 06/01/2018     Lab Results   Component Value Date    TSH 3.96 05/22/2018    W8SSYVW 7.9 05/22/2018     Lab Results   Component Value Date    WBC 6.7 05/25/2018    HGB 12.5 (L) 05/25/2018    HCT 37.6 (L) 05/25/2018    MCV 93.0 05/25/2018     05/25/2018     No components found for: CHLPL  Lab Results   Component Value Date    TRIG 114 02/02/2018    TRIG 163 (H) 12/16/2016    TRIG 150 12/19/2014     Lab Results   Component Value Date    HDL 60 02/02/2018    HDL 60 12/16/2016    HDL 58 12/19/2014     Lab Results   Component Value Date    LDLCALC 82 02/02/2018    LDLCALC 115 (H) 12/16/2016    LDLCALC 121 (H) 12/19/2014     Lab Results   Component Value Date    LABVLDL 23 02/02/2018    LABVLDL 33 12/16/2016    LABVLDL 30 12/19/2014     Radiology Review:  Pertinent images / reports were reviewed as a part of this visit and reveals the following:    Echo: 5/23/18  -LV function analysis is suboptimal due to rapid irregular rhythm, possibly   atrial fib. -LV function is probably moderately reduced with an ejection fraction   estimated at 35%. -Global hypokinesis. -Mild mitral annular calcification.   -Mild-moderate mitral regurgitation.   -Aortic valve appears sclerotic but opens adequately. -Mild to moderate aortic regurgitation.   -There is mild-to-moderate tricuspid regurgitation with a RVSP estimation of   44 mmHg.   -Dilated IVC with poor inspiration collapse consistent with elevated right   atrial pressure.   -Indeterminate diastolic function. Myoview: 5/24/18  Normal myocardial perfusion. -LV function is severely reduced with global hypokinesis and ejection    fraction of 26%. -Ejection fraction estimation can be inaccurate with atrial fibrillation.    -Study considered high risk due to severe LV dysfunction.          7/23/18: EKG sinus bradycardia reviewed by me

## 2018-07-26 ENCOUNTER — HOSPITAL ENCOUNTER (OUTPATIENT)
Dept: WOUND CARE | Age: 68
Discharge: HOME | End: 2018-07-26
Attending: INTERNAL MEDICINE | Admitting: INTERNAL MEDICINE

## 2018-07-26 VITALS
DIASTOLIC BLOOD PRESSURE: 63 MMHG | SYSTOLIC BLOOD PRESSURE: 149 MMHG | RESPIRATION RATE: 18 BRPM | HEART RATE: 68 BPM | TEMPERATURE: 98 F

## 2018-07-26 PROCEDURE — 11042 DBRDMT SUBQ TIS 1ST 20SQCM/<: CPT | Performed by: INTERNAL MEDICINE

## 2018-07-26 ASSESSMENT — PAIN DESCRIPTION - PAIN TYPE: TYPE: CHRONIC PAIN

## 2018-07-26 NOTE — PLAN OF CARE
Problem: Wound:  Goal: Will show signs of wound healing; wound closure and no evidence of infection  Will show signs of wound healing; wound closure and no evidence of infection   Outcome: Ongoing  Discharge instructions given. Patient verbalized understanding. Return to [facility] in 1 week.     [] antibiotics    [] X-ray     [] Culture   [x] Debridement      [] HBO Evaluation    [] LABS   [] Vascular Studies []

## 2018-08-02 ENCOUNTER — HOSPITAL ENCOUNTER (OUTPATIENT)
Dept: WOUND CARE | Age: 68
Discharge: HOME | End: 2018-08-02
Attending: INTERNAL MEDICINE | Admitting: INTERNAL MEDICINE

## 2018-08-02 VITALS
SYSTOLIC BLOOD PRESSURE: 139 MMHG | DIASTOLIC BLOOD PRESSURE: 67 MMHG | BODY MASS INDEX: 52 KG/M2 | HEART RATE: 66 BPM | WEIGHT: 315 LBS | TEMPERATURE: 99.2 F

## 2018-08-02 PROCEDURE — 11042 DBRDMT SUBQ TIS 1ST 20SQCM/<: CPT | Performed by: INTERNAL MEDICINE

## 2018-08-02 NOTE — PROGRESS NOTES
lower limb, unspecified 6/8/2011    Unspecified arthropathy, site unspecified 6/8/2011    Unspecified conjunctivitis 6/8/2011    Unspecified hypertrophic and atrophic condition of skin 6/8/2011    Unspecified sleep apnea        PAST SURGICAL HISTORY    Past Surgical History:   Procedure Laterality Date    CARPAL TUNNEL RELEASE      BILATERAL WRIST    COLONOSCOPY  4/19/16    KNEE ARTHROSCOPY      LEFT AND RIGHT    MOUTH SURGERY  80's    teeth pulled- bone removal    SHOULDER ARTHROPLASTY      RIGHT    TONSILLECTOMY AND ADENOIDECTOMY  AS CHILD       FAMILY HISTORY    Family History   Problem Relation Age of Onset    Cancer Mother         INDUSTRIAL RELATED    Heart Disease Mother     High Blood Pressure Mother     Stroke Mother     Cancer Father         LUNG CANCER    Lung Cancer Father     Cancer Brother        SOCIAL HISTORY    Social History   Substance Use Topics    Smoking status: Never Smoker    Smokeless tobacco: Never Used    Alcohol use No       ALLERGIES    Allergies   Allergen Reactions    Latex Itching and Swelling    Ether Other (See Comments)     fainting    Ibuprofen Swelling     Facial swelling      Local [Lidocaine Hcl] Other (See Comments)     Received 6/5/17 with no allergic response    Other Other (See Comments)     Diabetic medication-- gets sick patient can't tell me names of meds  States there are many medications he can/t take and he doesn't know reactions    Neosporin [Neomycin-Bacitracin Zn-Polymyx] Rash       MEDICATIONS    Current Outpatient Prescriptions on File Prior to Encounter   Medication Sig Dispense Refill    lisinopril (PRINIVIL;ZESTRIL) 5 MG tablet Take 2 tablets by mouth daily 90 tablet 1    tiZANidine (ZANAFLEX) 4 MG tablet Take 1 tablet by mouth every 8 hours as needed (muscle spasm) 90 tablet 1    rivaroxaban (XARELTO) 20 MG TABS tablet Take 1 tablet by mouth Daily with supper 90 tablet 2    spironolactone (ALDACTONE) 25 MG tablet Take 0.5 tablets

## 2018-08-02 NOTE — PLAN OF CARE
Problem: Wound:  Goal: Will show signs of wound healing; wound closure and no evidence of infection  Will show signs of wound healing; wound closure and no evidence of infection   Outcome: Ongoing  Discharge instructions given. Patient verbalized understanding. Return to [facility] in 1 week.   Pre auth for [employer]    [] antibiotics    [] X-ray     [] Culture   [x] Debridement      [] HBO Evaluation    [] LABS   [] Vascular Studies [x] Compession

## 2018-08-03 ENCOUNTER — OFFICE VISIT (OUTPATIENT)
Dept: FAMILY MEDICINE CLINIC | Age: 68
End: 2018-08-03

## 2018-08-03 ENCOUNTER — TELEPHONE (OUTPATIENT)
Dept: FAMILY MEDICINE CLINIC | Age: 68
End: 2018-08-03

## 2018-08-03 VITALS
DIASTOLIC BLOOD PRESSURE: 74 MMHG | WEIGHT: 315 LBS | HEIGHT: 74 IN | BODY MASS INDEX: 40.43 KG/M2 | SYSTOLIC BLOOD PRESSURE: 126 MMHG

## 2018-08-03 DIAGNOSIS — M79.604 ACUTE LEG PAIN, RIGHT: Primary | ICD-10-CM

## 2018-08-03 DIAGNOSIS — I48.0 PAROXYSMAL A-FIB (HCC): ICD-10-CM

## 2018-08-03 DIAGNOSIS — E11.42 DIABETIC POLYNEUROPATHY ASSOCIATED WITH TYPE 2 DIABETES MELLITUS (HCC): ICD-10-CM

## 2018-08-03 DIAGNOSIS — I10 ESSENTIAL HYPERTENSION: ICD-10-CM

## 2018-08-03 DIAGNOSIS — E66.9 DIABETES MELLITUS TYPE 2 IN OBESE (HCC): Primary | ICD-10-CM

## 2018-08-03 DIAGNOSIS — E11.69 DIABETES MELLITUS TYPE 2 IN OBESE (HCC): Primary | ICD-10-CM

## 2018-08-03 DIAGNOSIS — G95.9 CERVICAL MYELOPATHY (HCC): ICD-10-CM

## 2018-08-03 DIAGNOSIS — I50.22 CHRONIC SYSTOLIC CONGESTIVE HEART FAILURE (HCC): ICD-10-CM

## 2018-08-03 DIAGNOSIS — G47.33 OSA (OBSTRUCTIVE SLEEP APNEA): ICD-10-CM

## 2018-08-03 LAB
ANION GAP SERPL CALCULATED.3IONS-SCNC: 12 MMOL/L (ref 3–16)
BUN BLDV-MCNC: 21 MG/DL (ref 7–20)
CALCIUM SERPL-MCNC: 9.6 MG/DL (ref 8.3–10.6)
CHLORIDE BLD-SCNC: 101 MMOL/L (ref 99–110)
CO2: 29 MMOL/L (ref 21–32)
CREAT SERPL-MCNC: 1 MG/DL (ref 0.8–1.3)
GFR AFRICAN AMERICAN: >60
GFR NON-AFRICAN AMERICAN: >60
GLUCOSE BLD-MCNC: 166 MG/DL (ref 70–99)
POTASSIUM SERPL-SCNC: 4.5 MMOL/L (ref 3.5–5.1)
SODIUM BLD-SCNC: 142 MMOL/L (ref 136–145)

## 2018-08-03 PROCEDURE — 4040F PNEUMOC VAC/ADMIN/RCVD: CPT | Performed by: FAMILY MEDICINE

## 2018-08-03 PROCEDURE — 3046F HEMOGLOBIN A1C LEVEL >9.0%: CPT | Performed by: FAMILY MEDICINE

## 2018-08-03 PROCEDURE — 1101F PT FALLS ASSESS-DOCD LE1/YR: CPT | Performed by: FAMILY MEDICINE

## 2018-08-03 PROCEDURE — 2022F DILAT RTA XM EVC RTNOPTHY: CPT | Performed by: FAMILY MEDICINE

## 2018-08-03 PROCEDURE — G8417 CALC BMI ABV UP PARAM F/U: HCPCS | Performed by: FAMILY MEDICINE

## 2018-08-03 PROCEDURE — 3017F COLORECTAL CA SCREEN DOC REV: CPT | Performed by: FAMILY MEDICINE

## 2018-08-03 PROCEDURE — 1123F ACP DISCUSS/DSCN MKR DOCD: CPT | Performed by: FAMILY MEDICINE

## 2018-08-03 PROCEDURE — 99214 OFFICE O/P EST MOD 30 MIN: CPT | Performed by: FAMILY MEDICINE

## 2018-08-03 PROCEDURE — 1036F TOBACCO NON-USER: CPT | Performed by: FAMILY MEDICINE

## 2018-08-03 PROCEDURE — G8427 DOCREV CUR MEDS BY ELIG CLIN: HCPCS | Performed by: FAMILY MEDICINE

## 2018-08-03 PROCEDURE — 36415 COLL VENOUS BLD VENIPUNCTURE: CPT | Performed by: FAMILY MEDICINE

## 2018-08-03 RX ORDER — OXYCODONE AND ACETAMINOPHEN 7.5; 325 MG/1; MG/1
1 TABLET ORAL EVERY 6 HOURS PRN
Qty: 20 TABLET | Refills: 0 | Status: SHIPPED | OUTPATIENT
Start: 2018-08-03 | End: 2018-08-10

## 2018-08-03 NOTE — TELEPHONE ENCOUNTER
Patient was in this AM. Forgot to ask for refills of pain meds  Please send to [facility] on [facility]

## 2018-08-08 ENCOUNTER — HOSPITAL ENCOUNTER (OUTPATIENT)
Dept: WOUND CARE | Age: 68
Discharge: HOME | End: 2018-08-08
Attending: SURGERY | Admitting: SURGERY

## 2018-08-08 VITALS
DIASTOLIC BLOOD PRESSURE: 58 MMHG | SYSTOLIC BLOOD PRESSURE: 122 MMHG | TEMPERATURE: 97.1 F | HEART RATE: 76 BPM | RESPIRATION RATE: 16 BRPM

## 2018-08-08 PROCEDURE — 11042 DBRDMT SUBQ TIS 1ST 20SQCM/<: CPT | Performed by: SURGERY

## 2018-08-08 NOTE — PROGRESS NOTES
[address] Procedure Note    Nathan Gonzalez  AGE: 69 y.o. GENDER: male    : 1950  TODAY'S DATE: 2018    Chief Complaint   Patient presents with    Wound Check     right lower leg        History of Present Illness     Nathan Gonzalez is a 69 y.o. male who presents today for wound evaluation. History of Wound: venous wound located on the right leg. Wound Pain:  mild  Severity:  2 / 10   Wound Type:  venous and pressure  Modifying Factors:  edema and venous stasis  Associated Signs/Symptoms:  edema, drainage, odor and pain    Procedure Note:     Performed by: Rubina Brunner MD    Consent obtained: Yes    Time out taken: Yes    Pain Control: Anesthetic  Anesthetic: 4% Lidocaine Liquid Topical     Debridement: Excisional Debridement    Using curette the wound was sharply debrided    down through and including the removal of epidermis, dermis and subcutaneous tissue. Devitalized Tissue Debrided: fibrin, biofilm and slough    Pre Debridement Measurements:  Are located in the Wound Documentation Flow Sheet     Wound #: 3     Post  Debridement Measurements:  Wound 18 Leg Right; Lower; Lateral #3 (Active)   Wound Image   2018  9:14 AM   Wound Type Wound 2018  9:19 AM   Wound Diabetic Brewer 2 2018  9:19 AM   Dressing/Treatment Other (Comment) 2018 10:01 AM   Wound Cleansed Rinsed/Irrigated with saline 2018  9:50 AM   Wound Length (cm) 3 cm 2018  9:50 AM   Wound Width (cm) 4.5 cm 2018  9:50 AM   Wound Depth (cm)  0.2 2018  9:50 AM   Calculated Wound Size (cm^2) (l*w) 13.5 cm^2 2018  9:50 AM   Change in Wound Size % (l*w) -46.74 2018  9:50 AM   Wound Assessment Bleeding 2018  9:50 AM   Drainage Amount Moderate 2018  9:50 AM   Drainage Description Serosanguinous 2018  9:19 AM   Odor Mild 2018  9:19 AM   Amita-wound Assessment Yellow-brown 2018  9:19 AM   Silt%Wound Bed 0 2018  9:19 AM   Red%Wound Bed 50 2018 9:19 AM   Yellow%Wound Bed 50 8/8/2018  9:19 AM   Black%Wound Bed 0 8/8/2018  9:19 AM   Purple%Wound Bed 0 8/8/2018  9:19 AM   Other%Wound Bed 0 8/8/2018  9:19 AM   Time out Yes 8/8/2018  9:50 AM   Op First Treatment Date 07/12/18 7/12/2018  9:14 AM   Number of days: 27       Total Surface Area Debrided:  13.5 sq cm     Bleeding:  Minimal    Hemostasis Achieved:  by pressure    Procedural Pain:  2  / 10     Post Procedural Pain:  4 / 10     Response to treatment:  Well tolerated by patient. Assessment:     Wound looks stable. (improved, worse or stable)    Patient tolerated procedure well and was given proper instruction. The nature of the patient's condition was explained in depth. The patient was informed that their compliance to the treatment plan is paramount to successful healing and prevention of further ulceration and/or infection       Plan:     Treatment Plan: With each dressing change, rinse wounds with 0.9% Saline. (May use wound wash or soft contact solution. Both can be purchased at a local drug store). If unable to obtain saline, may use a gentle soap and water. Dressing care: Right lower leg- Santyl to open area on right leg, dry dressing, Unna boot- these will be changed at your next visit unless they slide down or cause pain. If they slide, gets wet, or cause pain please call the wound care center, you may need to come in to be re-wrapped. Jason Tre[address]  Melissa Guzman MD, FACS  8/8/2018  9:57 AM

## 2018-08-15 ENCOUNTER — HOSPITAL ENCOUNTER (OUTPATIENT)
Dept: WOUND CARE | Age: 68
Discharge: HOME | End: 2018-08-15
Attending: SURGERY | Admitting: SURGERY

## 2018-08-15 VITALS
DIASTOLIC BLOOD PRESSURE: 69 MMHG | SYSTOLIC BLOOD PRESSURE: 133 MMHG | WEIGHT: 315 LBS | HEART RATE: 65 BPM | BODY MASS INDEX: 51.49 KG/M2 | TEMPERATURE: 97 F

## 2018-08-15 PROCEDURE — 11042 DBRDMT SUBQ TIS 1ST 20SQCM/<: CPT | Performed by: SURGERY

## 2018-08-15 NOTE — PROGRESS NOTES
[address] Procedure Note    Josiah Irvin  AGE: 69 y.o. GENDER: male    : 1950  TODAY'S DATE: 8/15/2018    Chief Complaint   Patient presents with    Wound Check     right leg  F/U        History of Present Illness     Josiah Irvin is a 69 y.o. male who presents today for wound evaluation. History of Wound: venous wound located on the right leg. Wound Pain:  mild  Severity:  2 / 10   Wound Type:  venous and pressure  Modifying Factors:  edema and lymphedema  Associated Signs/Symptoms:  edema, drainage, odor and pain    Procedure Note:     Performed by: Jani Pulliam MD    Consent obtained: Yes    Time out taken: Yes    Pain Control: Anesthetic  Anesthetic: 4% Lidocaine Liquid Topical     Debridement: Excisional Debridement    Using curette the wound was sharply debrided    down through and including the removal of epidermis, dermis and subcutaneous tissue. Devitalized Tissue Debrided: fibrin, biofilm and slough    Pre Debridement Measurements:  Are located in the Wound Documentation Flow Sheet     Wound #: 3     Post  Debridement Measurements:  Wound 18 Leg Right; Lower; Lateral #3 (Active)   Wound Image   2018  9:14 AM   Wound Type Wound 8/15/2018  9:21 AM   Wound Diabetic Brewer 2 8/15/2018  9:21 AM   Dressing/Treatment Other (Comment) 2018 10:01 AM   Wound Cleansed Rinsed/Irrigated with saline 8/15/2018  9:41 AM   Wound Length (cm) 2.1 cm 8/15/2018  9:41 AM   Wound Width (cm) 4.3 cm 8/15/2018  9:41 AM   Wound Depth (cm)  0.2 8/15/2018  9:41 AM   Calculated Wound Size (cm^2) (l*w) 9.03 cm^2 8/15/2018  9:41 AM   Change in Wound Size % (l*w) 1.85 8/15/2018  9:41 AM   Wound Assessment Bleeding 8/15/2018  9:41 AM   Drainage Amount Moderate 8/15/2018  9:41 AM   Drainage Description Serosanguinous 8/15/2018  9:21 AM   Odor Mild 2018  9:19 AM   Amita-wound Assessment Excoriated;Red 8/15/2018  9:21 AM   [place]%Wound Bed 10 8/15/2018  9:21 AM   Red%Wound Bed 0 8/15/2018  9:21 AM   Yellow%Wound Bed 90 8/15/2018  9:21 AM   Black%Wound Bed 0 8/15/2018  9:21 AM   Purple%Wound Bed 0 8/15/2018  9:21 AM   Other%Wound Bed 0 8/15/2018  9:21 AM   Time out Yes 8/15/2018  9:41 AM   Op First Treatment Date 07/12/18 7/12/2018  9:14 AM   Number of days: 34       Total Surface Area Debrided:  9.03 sq cm     Bleeding:  Minimal    Hemostasis Achieved:  by pressure    Procedural Pain:  2  / 10     Post Procedural Pain:  3 / 10     Response to treatment:  Well tolerated by patient. Assessment:     Wound looks improving.  (improved, worse or stable)    Patient tolerated procedure well and was given proper instruction. The nature of the patient's condition was explained in depth. The patient was informed that their compliance to the treatment plan is paramount to successful healing and prevention of further ulceration and/or infection       Plan:     Treatment Plan: With each dressing change, rinse wounds with 0.9% Saline. (May use wound wash or soft contact solution. Both can be purchased at a local drug store). If unable to obtain saline, may use a gentle soap and water. Dressing care: Right lower leg- Santyl to open area on right leg, dry dressing, Unna boot- these will be changed at your next visit unless they slide down or cause pain. If they slide, gets wet, or cause pain please call the wound care center, you may need to come in to be re-wrapped. Jason Trejo 6  Oscar Coello MD, FACS  8/15/2018  9:45 AM

## 2018-08-23 ENCOUNTER — OFFICE VISIT (OUTPATIENT)
Dept: CARDIOLOGY CLINIC | Age: 68
End: 2018-08-23

## 2018-08-23 ENCOUNTER — HOSPITAL ENCOUNTER (OUTPATIENT)
Dept: WOUND CARE | Age: 68
Discharge: HOME | End: 2018-08-23
Attending: INTERNAL MEDICINE | Admitting: INTERNAL MEDICINE

## 2018-08-23 VITALS
DIASTOLIC BLOOD PRESSURE: 79 MMHG | SYSTOLIC BLOOD PRESSURE: 154 MMHG | RESPIRATION RATE: 20 BRPM | HEART RATE: 71 BPM | TEMPERATURE: 98.2 F

## 2018-08-23 VITALS
SYSTOLIC BLOOD PRESSURE: 128 MMHG | BODY MASS INDEX: 51.79 KG/M2 | WEIGHT: 315 LBS | HEART RATE: 80 BPM | DIASTOLIC BLOOD PRESSURE: 64 MMHG

## 2018-08-23 DIAGNOSIS — G47.33 OSA (OBSTRUCTIVE SLEEP APNEA): ICD-10-CM

## 2018-08-23 DIAGNOSIS — E66.01 MORBID OBESITY WITH BMI OF 50.0-59.9, ADULT (HCC): ICD-10-CM

## 2018-08-23 DIAGNOSIS — I50.23 ACUTE ON CHRONIC SYSTOLIC HEART FAILURE (HCC): ICD-10-CM

## 2018-08-23 DIAGNOSIS — E78.2 MIXED HYPERLIPIDEMIA: ICD-10-CM

## 2018-08-23 DIAGNOSIS — I48.0 PAROXYSMAL ATRIAL FIBRILLATION (HCC): Primary | ICD-10-CM

## 2018-08-23 PROCEDURE — 11042 DBRDMT SUBQ TIS 1ST 20SQCM/<: CPT | Performed by: INTERNAL MEDICINE

## 2018-08-23 PROCEDURE — G8427 DOCREV CUR MEDS BY ELIG CLIN: HCPCS | Performed by: NURSE PRACTITIONER

## 2018-08-23 PROCEDURE — 1123F ACP DISCUSS/DSCN MKR DOCD: CPT | Performed by: NURSE PRACTITIONER

## 2018-08-23 PROCEDURE — 1101F PT FALLS ASSESS-DOCD LE1/YR: CPT | Performed by: NURSE PRACTITIONER

## 2018-08-23 PROCEDURE — G8417 CALC BMI ABV UP PARAM F/U: HCPCS | Performed by: NURSE PRACTITIONER

## 2018-08-23 PROCEDURE — 1036F TOBACCO NON-USER: CPT | Performed by: NURSE PRACTITIONER

## 2018-08-23 PROCEDURE — 4040F PNEUMOC VAC/ADMIN/RCVD: CPT | Performed by: NURSE PRACTITIONER

## 2018-08-23 PROCEDURE — 99214 OFFICE O/P EST MOD 30 MIN: CPT | Performed by: NURSE PRACTITIONER

## 2018-08-23 PROCEDURE — 3017F COLORECTAL CA SCREEN DOC REV: CPT | Performed by: NURSE PRACTITIONER

## 2018-08-23 RX ORDER — LISINOPRIL 20 MG/1
20 TABLET ORAL DAILY
Qty: 30 TABLET | Refills: 3 | Status: SHIPPED | OUTPATIENT
Start: 2018-08-23 | End: 2018-01-01 | Stop reason: SDUPTHER

## 2018-08-23 RX ORDER — OXYCODONE AND ACETAMINOPHEN 7.5; 325 MG/1; MG/1
1 TABLET ORAL NIGHTLY PRN
COMMUNITY

## 2018-08-23 ASSESSMENT — PAIN SCALES - GENERAL: PAINLEVEL_OUTOF10: 0

## 2018-08-23 NOTE — PROGRESS NOTES
[facility]     Outpatient Follow Up Note    CHIEF COMPLAINT / HPI:Follow Up secondary to atrial fibrillation/ cardiomyopathy/ CHF/ YOKO/ and hyperlipidemia       Ashlee Mcmahon is 69 y.o. male who presents today for a routine follow up related to the above mentioned issues. Subjective:   Since the time of last office visit, the patient admits their symptoms have improved. Patient is being seen OV secondary to atrial fibrillation/ cardiomyopathy/ CHF/ YOKO/ and hyperlipidemia. Patient underwent a successful cardioversion on 6/22/18. At today's OV patient complains of dizziness, he is current in regular rhythm. He is on Xarelto 20 mg daily. Orthostatics B/P- were negative. He denies any chest pain, palpitations, SOB, or edema. With regard to medication therapy the patient has been compliant with prescribed regimen. They have tolerated therapy to date.      Past Medical History:   Diagnosis Date    A-fib [facility])     Acute bronchitis 6/8/2011    Acute frontal sinusitis 6/8/2011    Acute upper respiratory infections of unspecified site 6/8/2011    Back pain     CAD (coronary artery disease)     Chronic kidney disease     Degeneration of intervertebral disc, site unspecified 6/8/2011    Diabetes type 2, uncontrolled ([facility] [address] 6/21/2011    DJD (degenerative joint disease)     Edema 6/8/2011    legs and feet    Enthesopathy of hip region 6/8/2011    Essential hypertension, benign 6/8/2011    Fracture, ankle     L    Fracture, shoulder     R    Headache(784.0)     Hypercholesteremia     Hypertension     Hyperthyroidism     Hypothyroid 6/21/2011    Knee pain     YOKO (obstructive sleep apnea) 8/30/2011    cpap doesnt use    Other specified viral warts 6/8/2011    Personal history of other (corrected) congenital malformations     R TESTICAL MALFORMED    Type II or unspecified type diabetes mellitus without mention of complication, not stated as uncontrolled     Ulcer of lower limb, unspecified 6/8/2011    Unspecified arthropathy, site unspecified 6/8/2011    Unspecified conjunctivitis 6/8/2011    Unspecified hypertrophic and atrophic condition of skin 6/8/2011    Unspecified sleep apnea      Social History:    History   Smoking Status    Never Smoker   Smokeless Tobacco    Never Used     Current Medications:  Current Outpatient Prescriptions   Medication Sig Dispense Refill    lisinopril (PRINIVIL;ZESTRIL) 5 MG tablet Take 2 tablets by mouth daily 90 tablet 1    tiZANidine (ZANAFLEX) 4 MG tablet Take 1 tablet by mouth every 8 hours as needed (muscle spasm) 90 tablet 1    rivaroxaban (XARELTO) 20 MG TABS tablet Take 1 tablet by mouth Daily with supper 90 tablet 2    spironolactone (ALDACTONE) 25 MG tablet Take 0.5 tablets by mouth daily 90 tablet 2    metoprolol succinate (TOPROL XL) 50 MG extended release tablet Take 1 tablet by mouth daily 90 tablet 1    furosemide (LASIX) 40 MG tablet Take 1 tablet by mouth 2 times daily 180 tablet 1    levothyroxine (SYNTHROID) 25 MCG tablet Take 1 tablet by mouth Daily 1 po daily on empty stomach 90 tablet 2    tamsulosin (FLOMAX) 0.4 MG capsule Take 1 capsule by mouth daily 90 capsule 2    pravastatin (PRAVACHOL) 20 MG tablet TAKE ONE TABLET BY MOUTH EVERY EVENING 90 tablet 3    insulin aspart (NOVOLOG FLEXPEN) 100 UNIT/ML injection pen Inject 8 Units into the skin 3 times daily (before meals) 5 pen 2    aspirin 81 MG chewable tablet Take 1 tablet by mouth daily 90 tablet 1    insulin glargine (LANTUS) 100 UNIT/ML injection pen Inject 20 Units into the skin 2 times daily (Patient taking differently: Inject 40 Units into the skin nightly ) 5 pen 3    glucose blood VI test strips (ASCENSIA AUTODISC VI;ONE TOUCH ULTRA TEST VI) strip Apply 1 each topically 3 times daily 100 strip 11    Insulin Pen Needle (B-D ULTRAFINE III SHORT PEN) 31G X 8 MM MISC Inject 1 each as directed three times daily 100 each 5     No current facility-administered medications for this visit. REVIEW OF SYSTEMS:   CONSTITUTIONAL: No major weight gain or loss, fatigue, weakness, night sweats or fever. There's been no change in energy level, sleep pattern, or activity level.   + dizziness  HEENT: No new vision difficulties or ringing in the ears. RESPIRATORY: No new SOB, PND, orthopnea or cough. CARDIOVASCULAR: See HPI  GI: No nausea, vomiting, diarrhea, constipation, abdominal pain or changes in bowel habits. : No urinary frequency, urgency, incontinence hematuria or dysuria. SKIN: No cyanosis or skin lesions. MUSCULOSKELETAL: No new muscle or joint pain. NEUROLOGICAL: No syncope or TIA-like symptoms. PSYCHIATRIC: No anxiety, pain, insomnia or depression    Objective:   PHYSICAL EXAM:        VITALS:    Vitals:    08/23/18 1019   BP: 128/64   Pulse: 80           CONSTITUTIONAL: Cooperative, no apparent distress, and appears well nourished / developed  NEUROLOGIC:  Awake and orientated to person, place and time. PSYCH: Calm affect. SKIN: Warm and dry. HEENT: Sclera non-icteric, normocephalic, neck supple, no elevation of JVP, normal carotid pulses with no bruits and thyroid normal size. LUNGS:  No increased work of breathing and clear to auscultation, no crackles or wheezing. CARDIOVASCULAR:  regular rhythm with controlled rate  with no murmurs, gallops, rubs, or abnormal heart sounds, normal PMI. The apical impulses not displaced. Heart tones are crisp and normal. Cervical veins are not engorged                 JVP less than 8 cm H2O                                                                              The carotid upstroke is normal in amplitude and contour without delay or bruit    ABDOMEN:  Normal bowel sounds, non-distended and non-tender to palpation   EXT: No edema, no calf tenderness. Pulses are present bilaterally.     DATA:    Lab Results   Component Value Date    ALT 28 05/22/2018    AST 23 05/22/2018    ALKPHOS 80 05/22/2018    BILITOT 0.5 05/22/2018     Lab Results   Component Value Date    CREATININE 1.0 08/03/2018    BUN 21 (H) 08/03/2018     08/03/2018    K 4.5 08/03/2018     08/03/2018    CO2 29 08/03/2018     Lab Results   Component Value Date    TSH 3.96 05/22/2018    N5WRIHZ 7.9 05/22/2018     Lab Results   Component Value Date    WBC 6.7 05/25/2018    HGB 12.5 (L) 05/25/2018    HCT 37.6 (L) 05/25/2018    MCV 93.0 05/25/2018     05/25/2018     No components found for: CHLPL  Lab Results   Component Value Date    TRIG 114 02/02/2018    TRIG 163 (H) 12/16/2016    TRIG 150 12/19/2014     Lab Results   Component Value Date    HDL 60 02/02/2018    HDL 60 12/16/2016    HDL 58 12/19/2014     Lab Results   Component Value Date    LDLCALC 82 02/02/2018    LDLCALC 115 (H) 12/16/2016    LDLCALC 121 (H) 12/19/2014     Lab Results   Component Value Date    LABVLDL 23 02/02/2018    LABVLDL 33 12/16/2016    LABVLDL 30 12/19/2014     Radiology Review:  Pertinent images / reports were reviewed as a part of this visit and reveals the following:    Echo: 5/23/18  -LV function analysis is suboptimal due to rapid irregular rhythm, possibly   atrial fib. -LV function is probably moderately reduced with an ejection fraction   estimated at 35%. -Global hypokinesis. -Mild mitral annular calcification.   -Mild-moderate mitral regurgitation.   -Aortic valve appears sclerotic but opens adequately. -Mild to moderate aortic regurgitation.   -There is mild-to-moderate tricuspid regurgitation with a RVSP estimation of   44 mmHg.   -Dilated IVC with poor inspiration collapse consistent with elevated right   atrial pressure.   -Indeterminate diastolic function. Myoview: 5/24/18  Normal myocardial perfusion. -LV function is severely reduced with global hypokinesis and ejection    fraction of 26%. -Ejection fraction estimation can be inaccurate with atrial fibrillation.    -Study considered high risk due to severe LV dysfunction. 7/23/18: EKG sinus bradycardia reviewed by me   Assessment:     Atrial fibrillation:              Patient has high [id number] score of 3              On Xarelto 20 mg daily, and Toprol XL , (creat. Clearance 181)              Currently in regular rhythm               Plan : continue current medications      - Acute systolic and diastolic heart failure:             5/23/18:  Last ECHO: EF 35%            Patient appears stable on physical exam             On Lisinopril, Toprol XL, Lasix, and Aldactone             Plan: increase Lisinopril to 20 mg daily, BMP in two weeks, and follow up in one month      Hyperlipidemia          On Pravastatin 20 mg daily            2/2/ 18: HDL: 60, LDL: 82    YOKO: Recommended using CPAP     Morbid obesity: Body mass index is 54.11 kg/m². Weight loss, recommended      Patient  is stable since last office visit. Plan:   Order an event monitor- due to dizziness   Increase Lisinopril to 20 mg daily  Lab slip given for BMP- in two weeks  Follow up in one month     I have addressed the patient's cardiac risk factors and adjusted pharmacologic treatment as needed. In addition, I have reinforced the need for patient directed risk factor modification. Further evaluation will be based upon the patient's clinical course and testing results. All questions and concerns were addressed to the patient/family. Alternatives to  treatment were discussed. The patient  currently  is not smoking. The risks related to smoking were reviewed with the patient. Recommend maintaining a smoke-free lifestyle. Products available for smoking cessation were discussed. Daily weight, low sodium diet were discussed. Patient instructed to call the office with a weight gain: > 3 lbs over night or 5 lbs in one week; swelling, SOB/orthopnea/PND    Anti-coagulation has been prescribed for this patient. Education conducted on adverse reactions including bleeding was discussed.  The patient verbalizes

## 2018-08-23 NOTE — PROGRESS NOTES
[facility]   Progress Note and Procedure Note      Josiah Irvin  AGE: 69 y.o. GENDER: male  : 1950  TODAY'S DATE:  2018    Subjective:     Chief Complaint   Patient presents with    Wound Check     right and left leg         HISTORY of PRESENT ILLNESS HPI     Josiah Irvin is a 69 y.o. male who presents today for wound evaluation. History of Wound: Last seen in wound clinic in 2017 for venous ulcers. BLE GSV vein ablations were not authorized by his insurance his wife says. They tried to use wraps (estimated 80% of the time) over the past year. They report right LE wound appearing 2 weeks ago with increasing pain and drainage. They tried using antibiotic ointment.      Wound Pain:  intermittent  Severity: 4 / 10   Wound Type:  venous and pressure  Modifying Factors:  edema and venous stasis  Associated Signs/Symptoms:  edema, drainage, odor and pain        PAST MEDICAL HISTORY        Diagnosis Date    A-fib [facility])     Acute bronchitis 2011    Acute frontal sinusitis 2011    Acute upper respiratory infections of unspecified site 2011    Back pain     CAD (coronary artery disease)     Chronic kidney disease     Degeneration of intervertebral disc, site unspecified 2011    Diabetes type 2, uncontrolled ([address] 2011    DJD (degenerative joint disease)     Edema 2011    legs and feet    Enthesopathy of hip region 2011    Essential hypertension, benign 2011    Fracture, ankle     L    Fracture, shoulder     R    Headache(784.0)     Hypercholesteremia     Hypertension     Hyperthyroidism     Hypothyroid 2011    Knee pain     YOKO (obstructive sleep apnea) 2011    cpap doesnt use    Other specified viral warts 2011    Personal history of other (corrected) congenital malformations     R TESTICAL MALFORMED    Type II or unspecified type diabetes mellitus without mention of complication, not stated as uncontrolled     0.5 tablets by mouth daily 90 tablet 2    metoprolol succinate (TOPROL XL) 50 MG extended release tablet Take 1 tablet by mouth daily 90 tablet 1    furosemide (LASIX) 40 MG tablet Take 1 tablet by mouth 2 times daily 180 tablet 1    levothyroxine (SYNTHROID) 25 MCG tablet Take 1 tablet by mouth Daily 1 po daily on empty stomach 90 tablet 2    tamsulosin (FLOMAX) 0.4 MG capsule Take 1 capsule by mouth daily 90 capsule 2    pravastatin (PRAVACHOL) 20 MG tablet TAKE ONE TABLET BY MOUTH EVERY EVENING 90 tablet 3    aspirin 81 MG chewable tablet Take 1 tablet by mouth daily 90 tablet 1    insulin glargine (LANTUS) 100 UNIT/ML injection pen Inject 20 Units into the skin 2 times daily (Patient taking differently: Inject 40 Units into the skin nightly ) 5 pen 3    insulin aspart (NOVOLOG FLEXPEN) 100 UNIT/ML injection pen Inject 8 Units into the skin 3 times daily (before meals) 5 pen 2    glucose blood VI test strips (ASCENSIA AUTODISC VI;ONE TOUCH ULTRA TEST VI) strip Apply 1 each topically 3 times daily 100 strip 11    Insulin Pen Needle (B-D ULTRAFINE III SHORT PEN) 31G X 8 MM MISC Inject 1 each as directed three times daily 100 each 5     No current facility-administered medications on file prior to encounter. REVIEW OF SYSTEMS    Pertinent items are noted in HPI.       Objective:      BP (!) 154/79   Pulse 71   Temp 98.2 °F (36.8 °C) (Oral)   Resp 20     PHYSICAL EXAM    General Appearance: alert and oriented to person, place and time, well-developed and well-nourished, in no acute distress  Skin: warm and dry  Head: normocephalic and atraumatic  Eyes: extraocular eye movements intact and conjunctivae normal  Extremities: no cyanosis and no clubbing  Musculoskeletal: normal range of motion, no joint swelling, deformity or tenderness      Assessment:     Patient Active Problem List   Diagnosis    Cough    Acute sinusitis    Allergic rhinitis    Arthropathy    Acute upper respiratory infection located in the Wound Documentation Flow Sheet    Wound #: 3     Post  Debridement Measurements: Total Surface Area Debrided:  9.24 sq cm     Percentage of wound debrided 100%    Bleeding:  Minimal    Hemostasis Achieved:  by pressure    Procedural Pain:  6  / 10     Post Procedural Pain:  3 / 10     Response to treatment:  Well tolerated by patient. Plan:     The nature of the patient's condition was explained in depth. The patient was informed that their compliance to the treatment plan is paramount to successful healing and prevention of further ulceration and/or infection     Discharge Treatment Wound 07/12/18 Leg Right; Lower; Lateral #3-Dressing/Treatment: Dry dressing, Other (Comment) (santyl , unna boot )    Written Patient Discharge Instructions Given    Wound: Right Lower leg    With each dressing change, rinse wounds with 0.9% Saline. (May use wound wash or soft contact solution. Both can be purchased at a local drug store). If unable to obtain saline, may use a gentle soap and water. Dressing care: Right lower leg- Santyl to open area on right leg, dry dressing, Unna boot- these will be changed at your next visit unless they slide down or cause pain. If they slide, gets wet, or cause pain please call the wound care center, you may need to come in to be re-wrapped. Thank you for allowing me to participate in the care of your patient. Please do not hesitate to call.      Ignacio Jennings D.O., [facility] [place]  Interventional Cardiology     o: [phone]  [address] [place] [address]., [address], [address] [facility]

## 2018-08-29 NOTE — PROGRESS NOTES
[address] Procedure Note    Ashlee Mcmahon  AGE: 69 y.o. GENDER: male    : 1950  TODAY'S DATE: 2018    Chief Complaint   Patient presents with    Wound Check     right leg        History of Present Illness     Ashlee Mcmahon is a 69 y.o. male who presents today for wound evaluation. History of Wound: venous wound located on the right leg. Wound Pain:  mild  Severity:  3 / 10   Wound Type:  venous and pressure  Modifying Factors:  edema and venous stasis  Associated Signs/Symptoms:  edema, drainage, odor and pain    Procedure Note:     Performed by: Marie Marquez MD    Consent obtained: Yes    Time out taken: Yes    Pain Control: Anesthetic  Anesthetic:  (LAT)     Debridement: Excisional Debridement    Using curette the wound was sharply debrided    down through and including the removal of epidermis, dermis and subcutaneous tissue. Devitalized Tissue Debrided: fibrin, biofilm and slough    Pre Debridement Measurements:  Are located in the Wound Documentation Flow Sheet     Wound #: 3     Post  Debridement Measurements:  Wound 18 Leg Right; Lower; Lateral #3 (Active)   Wound Image   2018  9:14 AM   Wound Type Wound 2018  9:15 AM   Wound Diabetic Brewer 2 2018  9:15 AM   Dressing/Treatment Dry dressing; Other (Comment) 2018  9:35 AM   Wound Cleansed Rinsed/Irrigated with saline 2018  9:39 AM   Wound Length (cm) 2.2 cm 2018  9:39 AM   Wound Width (cm) 4.4 cm 2018  9:39 AM   Wound Depth (cm)  0.2 2018  9:39 AM   Calculated Wound Size (cm^2) (l*w) 9.68 cm^2 2018  9:39 AM   Change in Wound Size % (l*w) -5.22 2018  9:39 AM   Wound Assessment Bleeding 2018  9:39 AM   Drainage Amount Moderate 2018  9:39 AM   Drainage Description Yellow;Tan;Serosanguinous;Green 2018  9:15 AM   Odor Strong 2018  9:15 AM   Amita-wound Assessment Excoriated 2018  9:15 AM   [place]%Wound Bed 0 2018  9:15 AM

## 2018-08-29 NOTE — PLAN OF CARE
Problem: Wound:  Goal: Will show signs of wound healing; wound closure and no evidence of infection  Will show signs of wound healing; wound closure and no evidence of infection   Outcome: Ongoing  Discharge instructions given. Patient verbalized understanding. Return to [facility] in 1 week.     [] antibiotics    [] X-ray     [] Culture   [x] Debridement      [] HBO Evaluation    [] LABS   [] Vascular Studies []

## 2018-09-05 NOTE — PROGRESS NOTES
AM   Yellow%Wound Bed 80 9/5/2018  9:20 AM   Black%Wound Bed 0 9/5/2018  9:20 AM   Purple%Wound Bed 0 9/5/2018  9:20 AM   Other%Wound Bed 0 9/5/2018  9:20 AM   Time out Yes 9/5/2018  9:45 AM   Op First Treatment Date 07/12/18 7/12/2018  9:14 AM   Number of days: 55       Total Surface Area Debrided:  13.5 sq cm     Bleeding:  Minimal    Hemostasis Achieved:  by pressure    Procedural Pain:  2  / 10     Post Procedural Pain:  2 / 10     Response to treatment:  Well tolerated by patient. Assessment:     Wound looks improving.  (improved, worse or stable)    Patient tolerated procedure well and was given proper instruction. The nature of the patient's condition was explained in depth. The patient was informed that their compliance to the treatment plan is paramount to successful healing and prevention of further ulceration and/or infection       Plan:     Treatment Plan: With each dressing change, rinse wounds with 0.9% Saline. (May use wound wash or soft contact solution. Both can be purchased at a local drug store). If unable to obtain saline, may use a gentle soap and water. Dressing care: Right lower leg- Hydroferra Blue Ready, 4 x 4, Unna boot- these will be changed at your next visit unless they slide down or cause pain. If they slide, gets wet, or cause pain please call the wound care center, you may need to come in to be re-wrapped. Jason Trejo 6  Saloni Lujan MD, FACS  9/5/2018  4:41 PM

## 2018-09-12 NOTE — PROGRESS NOTES
Bed 0 9/12/2018  9:20 AM   Yellow%Wound Bed 0 9/12/2018  9:20 AM   Black%Wound Bed 0 9/12/2018  9:20 AM   Purple%Wound Bed 0 9/12/2018  9:20 AM   Other%Wound Bed 10 9/12/2018  9:20 AM   Time out Yes 9/12/2018  9:33 AM   Op First Treatment Date 07/12/18 7/12/2018  9:14 AM   Number of days: 62       Total Surface Area Debrided:  11.25 sq cm     Bleeding:  Minimal    Hemostasis Achieved:  by pressure    Procedural Pain:  1  / 10     Post Procedural Pain:  1 / 10     Response to treatment:  Well tolerated by patient. Assessment:     Wound looks improved. (improved, worse or stable)    Patient tolerated procedure well and was given proper instruction. The nature of the patient's condition was explained in depth. The patient was informed that their compliance to the treatment plan is paramount to successful healing and prevention of further ulceration and/or infection       Plan:     Treatment Plan: With each dressing change, rinse wounds with 0.9% Saline. (May use wound wash or soft contact solution. Both can be purchased at a local drug store). If unable to obtain saline, may use a gentle soap and water. Dressing care: Right lower leg- Hydroferra Blue Ready, 4 x 4, Unna boot- these will be changed at your next visit unless they slide down or cause pain. If they slide, gets wet, or cause pain please call the wound care center, you may need to come in to be re-wrapped. Jason Trejo 6  Swapna Dobson MD, FACS  9/12/2018  9:50 AM

## 2018-09-19 NOTE — PROGRESS NOTES
Compression Application   Below Knee    NAME:  Amado Martell  YOB: 1950  MEDICAL RECORD NUMBER:  [id number]  DATE:  9/19/2018       Applied moisturizing agent to dry skin as needed. Appied primary and secondary dressing as ordered     Applied  Unna Boot wrap from toes to knee overlapping each time. Applied cast padding and coban from toes to below the knee. Instructed patient/caregiver to keep dressing dry and intact. DO NOT REMOVE DRESSING. Instructed pt/family/caregiver to report excessive draining, loose bandage, wet dressing, severe pain or tingling in toes. Applied Compression dressing below the knee to Right lower leg     Compression wrap(s) were applied per  Guidelines.      Electronically signed by Ellie Fragoso LPN on 0/03/7751 at 3:45 AM

## 2018-09-19 NOTE — PROGRESS NOTES
[address] Procedure Note    Jaiden Back  AGE: 69 y.o. GENDER: male    : 1950  TODAY'S DATE: 2018    Chief Complaint   Patient presents with    Wound Check     bilateral lower legs        History of Present Illness     Jaiden Back is a 69 y.o. male who presents today for wound evaluation. History of Wound: venous wound located on the right leg. Wound Pain:  mild  Severity:  1 / 10   Wound Type:  venous and pressure  Modifying Factors:  edema and venous stasis  Associated Signs/Symptoms:  edema, drainage and odor    Procedure Note:     Performed by: Lana Sam MD    Consent obtained: Yes    Time out taken: Yes    Pain Control: Anesthetic  Anesthetic: 4% Lidocaine Liquid Topical     Debridement: Excisional Debridement    Using curette the wound was sharply debrided    down through and including the removal of epidermis, dermis and subcutaneous tissue. Devitalized Tissue Debrided: fibrin, biofilm and slough    Pre Debridement Measurements:  Are located in the Wound Documentation Flow Sheet     Wound #: 3     Post  Debridement Measurements:  Wound 18 Leg Right; Lower; Lateral #3 (Active)   Wound Image   2018  9:14 AM   Wound Type Wound 2018  9:10 AM   Wound Diabetic Brewer 2 2018  9:10 AM   Dressing/Treatment 4x4;Other (Comment) 2018 11:02 AM   Wound Cleansed Rinsed/Irrigated with saline 2018  9:35 AM   Wound Length (cm) 2.7 cm 2018  9:35 AM   Wound Width (cm) 2.7 cm 2018  9:35 AM   Wound Depth (cm)  0.1 2018  9:35 AM   Calculated Wound Size (cm^2) (l*w) 7.29 cm^2 2018  9:35 AM   Change in Wound Size % (l*w) 20.76 2018  9:35 AM   Wound Assessment Bleeding 2018  9:35 AM   Drainage Amount Moderate 2018  9:35 AM   Drainage Description Serosanguinous 2018  9:10 AM   Odor None 2018  9:10 AM   Amita-wound Assessment Excoriated 2018  9:10 AM   [place]%Wound Bed 100 2018  9:10 AM Red%Wound Bed 0 9/19/2018  9:10 AM   Yellow%Wound Bed 0 9/19/2018  9:10 AM   Black%Wound Bed 0 9/19/2018  9:10 AM   Purple%Wound Bed 0 9/19/2018  9:10 AM   Other%Wound Bed 0 9/19/2018  9:10 AM   Time out Yes 9/19/2018  9:35 AM   Op First Treatment Date 07/12/18 7/12/2018  9:14 AM   Number of days: 69       Total Surface Area Debrided:  7.29 sq cm     Bleeding:  Minimal    Hemostasis Achieved:  by pressure    Procedural Pain:  1  / 10     Post Procedural Pain:  1 / 10     Response to treatment:  Well tolerated by patient. Assessment:     Wound looks improved. (improved, worse or stable)    Patient tolerated procedure well and was given proper instruction. The nature of the patient's condition was explained in depth. The patient was informed that their compliance to the treatment plan is paramount to successful healing and prevention of further ulceration and/or infection       Plan:     Treatment Plan: With each dressing change, rinse wounds with 0.9% Saline. (May use wound wash or soft contact solution. Both can be purchased at a local drug store). If unable to obtain saline, may use a gentle soap and water. Dressing care: Right lower leg- Hydroferra Blue Ready, 4 x 4, Unna boot- these will be changed at your next visit unless they slide down or cause pain. If they slide, gets wet, or cause pain please call the wound care center, you may need to come in to be re-wrapped. Jason Trejo 6  Pedro Muñoz MD, FACS  9/19/2018  9:59 AM

## 2018-09-26 NOTE — PROGRESS NOTES
three times daily 100 each 5     No current facility-administered medications for this visit. REVIEW OF SYSTEMS:   CONSTITUTIONAL: No major weight gain or loss, fatigue, weakness, night sweats or fever. There's been no change in energy level, sleep pattern, or activity level. HEENT: No new vision difficulties or ringing in the ears. RESPIRATORY: No new SOB, PND, orthopnea or cough. CARDIOVASCULAR: See HPI  GI: No nausea, vomiting, diarrhea, constipation, abdominal pain or changes in bowel habits. : No urinary frequency, urgency, incontinence hematuria or dysuria. SKIN: No cyanosis or skin lesions. MUSCULOSKELETAL: No new muscle or joint pain. NEUROLOGICAL: No syncope or TIA-like symptoms. PSYCHIATRIC: No anxiety, pain, insomnia or depression    Objective:   PHYSICAL EXAM:        VITALS:    Vitals:    09/28/18 1025   BP: (!) 140/56   Pulse: 60             CONSTITUTIONAL: Cooperative, no apparent distress, and appears well nourished / developed  NEUROLOGIC:  Awake and orientated to person, place and time. PSYCH: Calm affect. SKIN: Warm and dry. HEENT: Sclera non-icteric, normocephalic, neck supple, no elevation of JVP, normal carotid pulses with no bruits and thyroid normal size. LUNGS:  No increased work of breathing and clear to auscultation, no crackles or wheezing. CARDIOVASCULAR:  regular rhythm with controlled rate  with no murmurs, gallops, rubs, or abnormal heart sounds, normal PMI. The apical impulses not displaced. Heart tones are crisp and normal. Cervical veins are not engorged                 JVP less than 8 cm H2O                                                                              The carotid upstroke is normal in amplitude and contour without delay or bruit    ABDOMEN:  Normal bowel sounds, non-distended and non-tender to palpation   EXT: No edema, no calf tenderness. Pulses are present bilaterally.     DATA:    Lab Results   Component Value Date    ALT 28 05/22/2018 AST 23 05/22/2018    ALKPHOS 80 05/22/2018    BILITOT 0.5 05/22/2018     Lab Results   Component Value Date    CREATININE 1.0 08/03/2018    BUN 21 (H) 08/03/2018     08/03/2018    K 4.5 08/03/2018     08/03/2018    CO2 29 08/03/2018     Lab Results   Component Value Date    TSH 3.96 05/22/2018    L4TVKML 7.9 05/22/2018     Lab Results   Component Value Date    WBC 6.7 05/25/2018    HGB 12.5 (L) 05/25/2018    HCT 37.6 (L) 05/25/2018    MCV 93.0 05/25/2018     05/25/2018     No components found for: CHLPL  Lab Results   Component Value Date    TRIG 114 02/02/2018    TRIG 163 (H) 12/16/2016    TRIG 150 12/19/2014     Lab Results   Component Value Date    HDL 60 02/02/2018    HDL 60 12/16/2016    HDL 58 12/19/2014     Lab Results   Component Value Date    LDLCALC 82 02/02/2018    LDLCALC 115 (H) 12/16/2016    LDLCALC 121 (H) 12/19/2014     Lab Results   Component Value Date    LABVLDL 23 02/02/2018    LABVLDL 33 12/16/2016    LABVLDL 30 12/19/2014     Radiology Review:  Pertinent images / reports were reviewed as a part of this visit and reveals the following:    Echo: 5/23/18  -LV function analysis is suboptimal due to rapid irregular rhythm, possibly   atrial fib. -LV function is probably moderately reduced with an ejection fraction   estimated at 35%. -Global hypokinesis. -Mild mitral annular calcification.   -Mild-moderate mitral regurgitation.   -Aortic valve appears sclerotic but opens adequately. -Mild to moderate aortic regurgitation.   -There is mild-to-moderate tricuspid regurgitation with a RVSP estimation of   44 mmHg.   -Dilated IVC with poor inspiration collapse consistent with elevated right   atrial pressure.   -Indeterminate diastolic function. Myoview: 5/24/18  Normal myocardial perfusion. -LV function is severely reduced with global hypokinesis and ejection    fraction of 26%.     -Ejection fraction estimation can be inaccurate with atrial fibrillation.    -Study adverse reactions including bleeding was discussed. The patient verbalizes understanding. Pt is on a BB  Pt is on an ace-i/ARB  Pt is on a statin    Saturated fat diet discussed  Exercise program discussed    Thank you for allowing to us to participate in the care of Brittney Richardson CNP

## 2018-09-26 NOTE — PROGRESS NOTES
[address] Progress Note    Ogla Avendano  AGE: 69 y.o. GENDER: male    : 1950  TODAY'S DATE: 2018    Subjective:     Chief Complaint   Patient presents with    Wound Check     right leg F/U         History of Present Illness     Olga Avendano presents today for wound evaluation. History of Wound: 76-year-old male presents with right leg venous stasis ulcer that has healed. He has no new complaints    Wound Type:  venous  Wound Location: right side  leg  Wound Pain:  none  Severity:  1 / 10   Timing: resolved  Modifying Factors:  edema and venous stasis  Associated Signs/Symptoms:  edema  Other significant symptoms or pertinent wound history: as above.      Past Medical History:   Diagnosis Date    A-fib [facility])     Acute bronchitis 2011    Acute frontal sinusitis 2011    Acute upper respiratory infections of unspecified site 2011    Back pain     CAD (coronary artery disease)     Chronic kidney disease     Degeneration of intervertebral disc, site unspecified 2011    Diabetes type 2, uncontrolled ([address] 2011    DJD (degenerative joint disease)     Edema 2011    legs and feet    Enthesopathy of hip region 2011    Essential hypertension, benign 2011    Fracture, ankle     L    Fracture, shoulder     R    Headache(784.0)     Hypercholesteremia     Hypertension     Hyperthyroidism     Hypothyroid 2011    Knee pain     YOKO (obstructive sleep apnea) 2011    cpap doesnt use    Other specified viral warts 2011    Personal history of other (corrected) congenital malformations     R TESTICAL MALFORMED    Type II or unspecified type diabetes mellitus without mention of complication, not stated as uncontrolled     Ulcer of lower limb, unspecified 2011    Unspecified arthropathy, site unspecified 2011    Unspecified conjunctivitis 2011    Unspecified hypertrophic and atrophic condition of skin 2011  Unspecified sleep apnea        Past Surgical History:   Procedure Laterality Date    CARPAL TUNNEL RELEASE      BILATERAL WRIST    COLONOSCOPY  4/19/16    KNEE ARTHROSCOPY      LEFT AND RIGHT    MOUTH SURGERY  80's    teeth pulled- bone removal    SHOULDER ARTHROPLASTY      RIGHT    TONSILLECTOMY AND ADENOIDECTOMY  AS CHILD       Current Outpatient Prescriptions   Medication Sig Dispense Refill    lisinopril (PRINIVIL;ZESTRIL) 20 MG tablet Take 1 tablet by mouth daily 90 tablet 3    tiZANidine (ZANAFLEX) 4 MG tablet Take 1 tablet by mouth every 8 hours as needed (muscle spasm) 90 tablet 1    rivaroxaban (XARELTO) 20 MG TABS tablet Take 1 tablet by mouth Daily with supper 90 tablet 2    spironolactone (ALDACTONE) 25 MG tablet Take 0.5 tablets by mouth daily 90 tablet 2    metoprolol succinate (TOPROL XL) 50 MG extended release tablet Take 1 tablet by mouth daily 90 tablet 1    furosemide (LASIX) 40 MG tablet Take 1 tablet by mouth 2 times daily 180 tablet 1    levothyroxine (SYNTHROID) 25 MCG tablet Take 1 tablet by mouth Daily 1 po daily on empty stomach 90 tablet 2    tamsulosin (FLOMAX) 0.4 MG capsule Take 1 capsule by mouth daily 90 capsule 2    pravastatin (PRAVACHOL) 20 MG tablet TAKE ONE TABLET BY MOUTH EVERY EVENING 90 tablet 3    aspirin 81 MG chewable tablet Take 1 tablet by mouth daily 90 tablet 1    insulin glargine (LANTUS) 100 UNIT/ML injection pen Inject 20 Units into the skin 2 times daily (Patient taking differently: Inject 40 Units into the skin nightly ) 5 pen 3    glucose blood VI test strips (ASCENSIA AUTODISC VI;ONE TOUCH ULTRA TEST VI) strip Apply 1 each topically 3 times daily 100 strip 11    Insulin Pen Needle (B-D ULTRAFINE III SHORT PEN) 31G X 8 MM MISC Inject 1 each as directed three times daily 100 each 5    oxyCODONE-acetaminophen (PERCOCET) 7.5-325 MG per tablet Take 1 tablet by mouth nightly as needed for Pain. .      insulin aspart (NOVOLOG FLEXPEN) 100 UNIT/ML injection pen Inject 8 Units into the skin 3 times daily (before meals) 5 pen 2     No current facility-administered medications for this encounter. Allergies   Allergen Reactions    Latex Itching and Swelling    Ether Other (See Comments)     fainting    Ibuprofen Swelling     Facial swelling      Local [Lidocaine Hcl] Other (See Comments)     Received 6/5/17 with no allergic response    Other Other (See Comments)     Diabetic medication-- gets sick patient can't tell me names of meds  States there are many medications he can/t take and he doesn't know reactions    Neosporin [Neomycin-Bacitracin Zn-Polymyx] Rash             REVIEW OF SYSTEMS    Pertinent items from the review of systems are discussed in the HPI; the remainder of the ROS was reviewed and is negative.     Objective:      BP (!) 140/69   Pulse 73   Temp 97 °F (36.1 °C)   Wt (!) 401 lb (181.9 kg)   BMI 51.49 kg/m²     PHYSICAL EXAM    General Appearance: alert and oriented to person, place and time, well developed and well- nourished, in no acute distress  Skin: warm and dry, no rash or erythema  Head: normocephalic and atraumatic  Right leg wound has healed, still with some edema      Assessment:     Patient Active Problem List   Diagnosis    Cough    Acute sinusitis    Allergic rhinitis    Arthropathy    Acute upper respiratory infection    Type 2 diabetes mellitus with complication (HCC)    Hypertrophic and atrophic condition of skin    Acute bronchitis    Other viral warts    Conjunctivitis    Enthesopathy of hip region    Degeneration of intervertebral disc    Essential hypertension    Edema    Leg ulcer, left (HCC)    Acute frontal sinusitis    Hypothyroid    Diabetes type 2, uncontrolled (HCC)    YOKO (obstructive sleep apnea)    Heart murmur    Rotator cuff (capsule) sprain    Venous insufficiency (chronic) (peripheral)    Diabetic cataract (HCC)    BPH (benign prostatic hyperplasia)    Morbid obesity with BMI of 50.0-59.9, adult ([facility]    Risk for falls    Diabetes mellitus with peripheral autonomic neuropathy (HCC)    Subclinical hypothyroidism    Cellulitis    Cellulitis of right lower extremity    Cat scratch    Nonrheumatic aortic valve insufficiency    Meralgia paresthetica    Chronic venous hypertension (idiopathic) with inflammation of bilateral lower extremity    Lymphedema of both lower extremities    Lymphedema    Chronic venous hypertension w ulceration (HCC)    Chronic left shoulder pain    Rotator cuff tendinitis    Cervical radiculitis    Exogenous obesity    Cervical myelopathy (HCC)    Atrial fibrillation with RVR (HCC)    History of noncompliance with medical treatment    Hyperglycemia    Acute on chronic systolic heart failure (HCC)    Mixed hyperlipidemia    Paroxysmal atrial fibrillation (HCC)    Venous stasis dermatitis of right lower extremity    Venous stasis ulcer of right lower extremity ([facility]    Open wound of right lower extremity    Open wound of right lower leg       Wound 07/12/18 Leg Right; Lower; Lateral #3 (Active)   Wound Image   9/26/2018  9:10 AM   Wound Type Wound 9/26/2018  9:10 AM   Wound Diabetic Brewer 2 9/26/2018  9:10 AM   Dressing/Treatment Dry dressing; Other (Comment) 9/19/2018  9:59 AM   Wound Cleansed Rinsed/Irrigated with saline 9/26/2018  9:10 AM   Wound Length (cm) 0 cm 9/26/2018  9:10 AM   Wound Width (cm) 0 cm 9/26/2018  9:10 AM   Wound Depth (cm)  0 9/26/2018  9:10 AM   Calculated Wound Size (cm^2) (l*w) 0 cm^2 9/26/2018  9:10 AM   Change in Wound Size % (l*w) 100 9/26/2018  9:10 AM   Wound Assessment Epithelialization 9/26/2018  9:10 AM   Drainage Amount Scant 9/26/2018  9:10 AM   Drainage Description Serosanguinous 9/26/2018  9:10 AM   Odor None 9/19/2018  9:10 AM   Amita-wound Assessment Excoriated 9/26/2018  9:10 AM   [place]%Wound Bed 100 9/26/2018  9:10 AM   Red%Wound Bed 0 9/26/2018  9:10 AM   Yellow%Wound Bed 0 9/26/2018  9:10 AM Black%Wound Bed 0 9/26/2018  9:10 AM   Purple%Wound Bed 0 9/26/2018  9:10 AM   Other%Wound Bed 0 9/26/2018  9:10 AM   Time out Yes 9/19/2018  9:35 AM   Op First Treatment Date 07/12/18 7/12/2018  9:14 AM   Number of days: 76         Plan:     The nature of the patient's condition was explained in depth. The patient was informed that their compliance to the treatment plan is paramount to successful healing and prevention of further ulceration and/or infection     Discharge Treatment           Written Patient Discharge Instructions Given       Wound has healed, no wound care necessary  We'll apply stockings instead of an Unna boot for light compression  Follow with vascular surgeon Dr. Brooke Diaz for follow-up regarding venous stasis electively  Follow-up wound clinic as needed    [address]  Swapna Dobson MD, FACS  9/26/2018  10:39 AM

## 2018-10-23 NOTE — PROGRESS NOTES
site unspecified 6/8/2011    Unspecified conjunctivitis 6/8/2011    Unspecified hypertrophic and atrophic condition of skin 6/8/2011    Unspecified sleep apnea      Social History:    History   Smoking Status    Never Smoker   Smokeless Tobacco    Never Used     Current Medications:  Current Outpatient Prescriptions   Medication Sig Dispense Refill    lisinopril (PRINIVIL;ZESTRIL) 20 MG tablet Take 1 tablet by mouth daily 90 tablet 3    oxyCODONE-acetaminophen (PERCOCET) 7.5-325 MG per tablet Take 1 tablet by mouth nightly as needed for Pain. Nasir Loaiza       tiZANidine (ZANAFLEX) 4 MG tablet Take 1 tablet by mouth every 8 hours as needed (muscle spasm) 90 tablet 1    rivaroxaban (XARELTO) 20 MG TABS tablet Take 1 tablet by mouth Daily with supper 90 tablet 2    metoprolol succinate (TOPROL XL) 50 MG extended release tablet Take 1 tablet by mouth daily 90 tablet 1    furosemide (LASIX) 40 MG tablet Take 1 tablet by mouth 2 times daily 180 tablet 1    levothyroxine (SYNTHROID) 25 MCG tablet Take 1 tablet by mouth Daily 1 po daily on empty stomach 90 tablet 2    tamsulosin (FLOMAX) 0.4 MG capsule Take 1 capsule by mouth daily 90 capsule 2    pravastatin (PRAVACHOL) 20 MG tablet TAKE ONE TABLET BY MOUTH EVERY EVENING 90 tablet 3    insulin aspart (NOVOLOG FLEXPEN) 100 UNIT/ML injection pen Inject 8 Units into the skin 3 times daily (before meals) 5 pen 2    aspirin 81 MG chewable tablet Take 1 tablet by mouth daily 90 tablet 1    insulin glargine (LANTUS) 100 UNIT/ML injection pen Inject 20 Units into the skin 2 times daily (Patient taking differently: Inject 40 Units into the skin nightly ) 5 pen 3    glucose blood VI test strips (ASCENSIA AUTODISC VI;ONE TOUCH ULTRA TEST VI) strip Apply 1 each topically 3 times daily 100 strip 11    Insulin Pen Needle (B-D ULTRAFINE III SHORT PEN) 31G X 8 MM MISC Inject 1 each as directed three times daily 100 each 5     No current facility-administered medications for this

## 2018-11-09 NOTE — PROGRESS NOTES
Subjective:      Patient ID: Goyo Auguste is a 69 y.o. male. HPI  Chief Complaint   Patient presents with    Diabetes     3 MO DM ROUTINE FOLLOW UP [facility] TODAY SEES FOOT DOCTOR     Other     COULD NOT DO PULSE, IT IS GOING FROM 70 -134     WENT OFF MEDS FOR 2 WEEKS - HAVING DIARRHEA/ DIZZYNESS  FELL ON RUMP RECENTLY. BACK ON MEDS AND MORE DIZY AGAIN  GOING TO BATHROOM EVERY 45 MINUTES ON MED - SLEPT THROUGH NIGHT FOR MOST PART OFF  SEEING HEART DOCTOR NOW  BP Readings from Last 3 Encounters:   11/09/18 138/84   10/24/18 128/60   09/28/18 (!) 140/56     Pulse Readings from Last 3 Encounters:   10/24/18 68   09/28/18 60   09/26/18 73     Wt Readings from Last 3 Encounters:   11/09/18 (!) 405 lb (183.7 kg)   10/24/18 (!) 404 lb (183.3 kg)   09/28/18 (!) 411 lb 1.9 oz (186.5 kg)   has afib - hr racing at times -   No cp other than rare occ brief stabbing pain - random -nonexertional  Seen by cardiac cnp recently - med increased  Lightheaded/ off balance sensation. Has to lay down at times  Breathing seems stable  Swelling not much different off lasix  Ef 25-35%  Mild -mod mr/ tr  Had lunate abrasion on leg - slowly healing - closed up now - has compression wraps on now to keep swelling down  Using hand lotion on leg skin right now. a1c 12.1 from 11.7 from 11.1%  Had toe debrided - still painful - seeing wound care - has blister on it now - getting debrided/ treated at wound care  No epistaxis, blood in stool, blood in urine  Sinus flares periodically  No insulin in quite awhile due to side effects - mainly injection site tenderness. Trouble checking sugar  Some urgency  Taking percocet/ muscle relaxant prn  Review of Systems  Saw eye doctor recently  Objective:   Physical Exam   Constitutional: He appears well-developed. No distress. HENT:   Mouth/Throat: Oropharynx is clear and moist.   Eyes: Conjunctivae are normal. No scleral icterus. Cardiovascular: Exam reveals no gallop. No murmur heard.   Irregular

## 2019-01-01 ENCOUNTER — TELEPHONE (OUTPATIENT)
Dept: CARDIOLOGY CLINIC | Age: 69
End: 2019-01-01

## 2019-01-01 ENCOUNTER — TELEPHONE (OUTPATIENT)
Dept: FAMILY MEDICINE CLINIC | Age: 69
End: 2019-01-01

## 2019-01-01 ENCOUNTER — HOSPITAL ENCOUNTER (OUTPATIENT)
Dept: NON INVASIVE DIAGNOSTICS | Age: 69
Discharge: HOME | End: 2019-01-15
Payer: MEDICARE

## 2019-01-01 ENCOUNTER — APPOINTMENT (OUTPATIENT)
Dept: CT IMAGING | Age: 69
End: 2019-01-01
Payer: MEDICARE

## 2019-01-01 ENCOUNTER — OFFICE VISIT (OUTPATIENT)
Dept: CARDIOLOGY CLINIC | Age: 69
End: 2019-01-01
Payer: MEDICARE

## 2019-01-01 ENCOUNTER — HOSPITAL ENCOUNTER (EMERGENCY)
Age: 69
Discharge: OTHER INSTITUTION | End: 2019-02-12
Attending: EMERGENCY MEDICINE
Payer: MEDICARE

## 2019-01-01 VITALS
SYSTOLIC BLOOD PRESSURE: 102 MMHG | BODY MASS INDEX: 38.36 KG/M2 | OXYGEN SATURATION: 97 % | TEMPERATURE: 103.1 F | HEART RATE: 137 BPM | DIASTOLIC BLOOD PRESSURE: 61 MMHG | WEIGHT: 315 LBS | HEIGHT: 76 IN | RESPIRATION RATE: 29 BRPM

## 2019-01-01 VITALS
WEIGHT: 315 LBS | RESPIRATION RATE: 16 BRPM | HEIGHT: 75 IN | HEART RATE: 86 BPM | DIASTOLIC BLOOD PRESSURE: 66 MMHG | BODY MASS INDEX: 39.17 KG/M2 | SYSTOLIC BLOOD PRESSURE: 128 MMHG
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
## 2019-01-01 DIAGNOSIS — I60.9 SUBARACHNOID BLEED (HCC): Primary | ICD-10-CM

## 2019-01-01 DIAGNOSIS — R65.21 SEPTIC SHOCK (HCC): ICD-10-CM

## 2019-01-01 DIAGNOSIS — I50.23 ACUTE ON CHRONIC SYSTOLIC HEART FAILURE (HCC): Primary | ICD-10-CM

## 2019-01-01 DIAGNOSIS — I34.0 NON-RHEUMATIC MITRAL REGURGITATION: ICD-10-CM

## 2019-01-01 DIAGNOSIS — I48.91 ATRIAL FIBRILLATION WITH RVR (HCC): ICD-10-CM

## 2019-01-01 DIAGNOSIS — A41.9 SEPTIC SHOCK (HCC): ICD-10-CM

## 2019-01-01 DIAGNOSIS — S06.5XAA SUBDURAL HEMATOMA: ICD-10-CM

## 2019-01-01 DIAGNOSIS — E78.2 MIXED HYPERLIPIDEMIA: ICD-10-CM

## 2019-01-01 DIAGNOSIS — A41.9 SEPTICEMIA (HCC): ICD-10-CM

## 2019-01-01 DIAGNOSIS — I10 ESSENTIAL HYPERTENSION: ICD-10-CM

## 2019-01-01 DIAGNOSIS — I48.0 PAROXYSMAL ATRIAL FIBRILLATION (HCC): ICD-10-CM

## 2019-01-01 LAB
ANION GAP SERPL CALCULATED.3IONS-SCNC: 17 MMOL/L (ref 3–16)
BANDED NEUTROPHILS RELATIVE PERCENT: 4 % (ref 0–7)
BASOPHILS ABSOLUTE: 0 K/UL (ref 0–0.2)
BASOPHILS RELATIVE PERCENT: 0 %
BLOOD CULTURE, ROUTINE: ABNORMAL
BLOOD CULTURE, ROUTINE: ABNORMAL
BUN BLDV-MCNC: 23 MG/DL (ref 7–20)
CALCIUM SERPL-MCNC: 9.3 MG/DL (ref 8.3–10.6)
CHLORIDE BLD-SCNC: 94 MMOL/L (ref 99–110)
CO2: 21 MMOL/L (ref 21–32)
CREAT SERPL-MCNC: 1.1 MG/DL (ref 0.8–1.3)
CULTURE, BLOOD 2: ABNORMAL
EKG ATRIAL RATE: 174 BPM
EKG DIAGNOSIS: NORMAL
EKG Q-T INTERVAL: 288 MS
EKG QRS DURATION: 100 MS
EKG QTC CALCULATION (BAZETT): 443 MS
EKG R AXIS: 69 DEGREES
EKG T AXIS: 99 DEGREES
EKG VENTRICULAR RATE: 142 BPM
EOSINOPHILS ABSOLUTE: 0 K/UL (ref 0–0.6)
EOSINOPHILS RELATIVE PERCENT: 0 %
GFR AFRICAN AMERICAN: >60
GFR NON-AFRICAN AMERICAN: >60
GLUCOSE BLD-MCNC: 383 MG/DL (ref 70–99)
HCT VFR BLD CALC: 44.9 % (ref 40.5–52.5)
HEMOGLOBIN: 14.3 G/DL (ref 13.5–17.5)
LACTIC ACID, SEPSIS: 3.4 MMOL/L (ref 0.4–1.9)
LV EF: 33 %
LVEF MODALITY: NORMAL
LYMPHOCYTES ABSOLUTE: 1 K/UL (ref 1–5.1)
LYMPHOCYTES RELATIVE PERCENT: 6 %
MCH RBC QN AUTO: 29.7 PG (ref 26–34)
MCHC RBC AUTO-ENTMCNC: 31.8 G/DL (ref 31–36)
MCV RBC AUTO: 93.5 FL (ref 80–100)
MONOCYTES ABSOLUTE: 0.5 K/UL (ref 0–1.3)
MONOCYTES RELATIVE PERCENT: 3 %
NEUTROPHILS ABSOLUTE: 15.3 K/UL (ref 1.7–7.7)
NEUTROPHILS RELATIVE PERCENT: 87 %
ORGANISM: ABNORMAL
PDW BLD-RTO: 13.8 % (ref 12.4–15.4)
PLATELET # BLD: 216 K/UL (ref 135–450)
PLATELET SLIDE REVIEW: ADEQUATE
PMV BLD AUTO: 9.9 FL (ref 5–10.5)
POTASSIUM SERPL-SCNC: 4.5 MMOL/L (ref 3.5–5.1)
RBC # BLD: 4.8 M/UL (ref 4.2–5.9)
REPORT: NORMAL
SLIDE REVIEW: ABNORMAL
SODIUM BLD-SCNC: 132 MMOL/L (ref 136–145)
TROPONIN: <0.01 NG/ML
WBC # BLD: 16.8 K/UL (ref 4–11)

## 2019-01-01 PROCEDURE — 31500 INSERT EMERGENCY AIRWAY: CPT

## 2019-01-01 PROCEDURE — 70450 CT HEAD/BRAIN W/O DYE: CPT

## 2019-01-01 PROCEDURE — 4040F PNEUMOC VAC/ADMIN/RCVD: CPT | Performed by: NURSE PRACTITIONER

## 2019-01-01 PROCEDURE — 84484 ASSAY OF TROPONIN QUANT: CPT

## 2019-01-01 PROCEDURE — 96375 TX/PRO/DX INJ NEW DRUG ADDON: CPT

## 2019-01-01 PROCEDURE — G8417 CALC BMI ABV UP PARAM F/U: HCPCS | Performed by: NURSE PRACTITIONER

## 2019-01-01 PROCEDURE — 93005 ELECTROCARDIOGRAM TRACING: CPT | Performed by: EMERGENCY MEDICINE

## 2019-01-01 PROCEDURE — 99284 EMERGENCY DEPT VISIT MOD MDM: CPT

## 2019-01-01 PROCEDURE — G8427 DOCREV CUR MEDS BY ELIG CLIN: HCPCS | Performed by: NURSE PRACTITIONER

## 2019-01-01 PROCEDURE — 96367 TX/PROPH/DG ADDL SEQ IV INF: CPT

## 2019-01-01 PROCEDURE — 6360000002 HC RX W HCPCS: Performed by: EMERGENCY MEDICINE

## 2019-01-01 PROCEDURE — 99214 OFFICE O/P EST MOD 30 MIN: CPT | Performed by: NURSE PRACTITIONER

## 2019-01-01 PROCEDURE — 2580000003 HC RX 258: Performed by: EMERGENCY MEDICINE

## 2019-01-01 PROCEDURE — 36415 COLL VENOUS BLD VENIPUNCTURE: CPT

## 2019-01-01 PROCEDURE — 80048 BASIC METABOLIC PNL TOTAL CA: CPT

## 2019-01-01 PROCEDURE — 2500000003 HC RX 250 WO HCPCS: Performed by: EMERGENCY MEDICINE

## 2019-01-01 PROCEDURE — 1036F TOBACCO NON-USER: CPT | Performed by: NURSE PRACTITIONER

## 2019-01-01 PROCEDURE — 96365 THER/PROPH/DIAG IV INF INIT: CPT

## 2019-01-01 PROCEDURE — 1123F ACP DISCUSS/DSCN MKR DOCD: CPT | Performed by: NURSE PRACTITIONER

## 2019-01-01 PROCEDURE — 3017F COLORECTAL CA SCREEN DOC REV: CPT | Performed by: NURSE PRACTITIONER

## 2019-01-01 PROCEDURE — 94002 VENT MGMT INPAT INIT DAY: CPT

## 2019-01-01 PROCEDURE — 87801 DETECT AGNT MULT DNA AMPLI: CPT

## 2019-01-01 PROCEDURE — 93010 ELECTROCARDIOGRAM REPORT: CPT | Performed by: INTERNAL MEDICINE

## 2019-01-01 PROCEDURE — 93306 TTE W/DOPPLER COMPLETE: CPT

## 2019-01-01 PROCEDURE — 85025 COMPLETE CBC W/AUTO DIFF WBC: CPT

## 2019-01-01 PROCEDURE — G8484 FLU IMMUNIZE NO ADMIN: HCPCS | Performed by: NURSE PRACTITIONER

## 2019-01-01 PROCEDURE — 87040 BLOOD CULTURE FOR BACTERIA: CPT

## 2019-01-01 PROCEDURE — 72125 CT NECK SPINE W/O DYE: CPT

## 2019-01-01 PROCEDURE — 1101F PT FALLS ASSESS-DOCD LE1/YR: CPT | Performed by: NURSE PRACTITIONER

## 2019-01-01 PROCEDURE — 83605 ASSAY OF LACTIC ACID: CPT

## 2019-01-01 RX ORDER — PROPOFOL 10 MG/ML
INJECTION, EMULSION INTRAVENOUS
Status: DISCONTINUED
Start: 2019-01-01 | End: 2019-01-01 | Stop reason: WASHOUT

## 2019-01-01 RX ORDER — MIDAZOLAM HYDROCHLORIDE 1 MG/ML
5 INJECTION INTRAMUSCULAR; INTRAVENOUS ONCE
Status: COMPLETED | OUTPATIENT
Start: 2019-01-01 | End: 2019-01-01

## 2019-01-01 RX ORDER — PROPOFOL 10 MG/ML
25 INJECTION, EMULSION INTRAVENOUS
Status: DISCONTINUED | OUTPATIENT
Start: 2019-01-01 | End: 2019-01-01 | Stop reason: HOSPADM

## 2019-01-01 RX ORDER — PROPOFOL 10 MG/ML
25 INJECTION, EMULSION INTRAVENOUS
Status: DISCONTINUED | OUTPATIENT
Start: 2019-01-01 | End: 2019-01-01 | Stop reason: SDUPTHER

## 2019-01-01 RX ORDER — SUCCINYLCHOLINE CHLORIDE 20 MG/ML
100 INJECTION INTRAMUSCULAR; INTRAVENOUS ONCE
Status: DISCONTINUED | OUTPATIENT
Start: 2019-01-01 | End: 2019-01-01 | Stop reason: HOSPADM

## 2019-01-01 RX ORDER — SUCCINYLCHOLINE CHLORIDE 20 MG/ML
INJECTION INTRAMUSCULAR; INTRAVENOUS DAILY PRN
Status: COMPLETED | OUTPATIENT
Start: 2019-01-01 | End: 2019-01-01

## 2019-01-01 RX ORDER — ETOMIDATE 2 MG/ML
INJECTION INTRAVENOUS DAILY PRN
Status: COMPLETED | OUTPATIENT
Start: 2019-01-01 | End: 2019-01-01

## 2019-01-01 RX ORDER — ONDANSETRON 2 MG/ML
8 INJECTION INTRAMUSCULAR; INTRAVENOUS ONCE
Status: COMPLETED | OUTPATIENT
Start: 2019-01-01 | End: 2019-01-01

## 2019-01-01 RX ORDER — 0.9 % SODIUM CHLORIDE 0.9 %
30 INTRAVENOUS SOLUTION INTRAVENOUS ONCE
Status: COMPLETED | OUTPATIENT
Start: 2019-01-01 | End: 2019-01-01

## 2019-01-01 RX ORDER — DILTIAZEM HYDROCHLORIDE 5 MG/ML
10 INJECTION INTRAVENOUS ONCE
Status: DISCONTINUED | OUTPATIENT
Start: 2019-01-01 | End: 2019-01-01

## 2019-01-01 RX ORDER — LEVETIRACETAM 10 MG/ML
1000 INJECTION INTRAVASCULAR ONCE
Status: COMPLETED | OUTPATIENT
Start: 2019-01-01 | End: 2019-01-01

## 2019-01-01 RX ORDER — 0.9 % SODIUM CHLORIDE 0.9 %
1000 INTRAVENOUS SOLUTION INTRAVENOUS ONCE
Status: COMPLETED | OUTPATIENT
Start: 2019-01-01 | End: 2019-01-01

## 2019-01-01 RX ORDER — LEVETIRACETAM 500 MG/5ML
1000 INJECTION, SOLUTION, CONCENTRATE INTRAVENOUS ONCE
Status: DISCONTINUED | OUTPATIENT
Start: 2019-01-01 | End: 2019-01-01 | Stop reason: CLARIF

## 2019-01-01 RX ORDER — ETOMIDATE 2 MG/ML
30 INJECTION INTRAVENOUS ONCE
Status: DISCONTINUED | OUTPATIENT
Start: 2019-01-01 | End: 2019-01-01 | Stop reason: HOSPADM

## 2019-01-01 RX ADMIN — SODIUM CHLORIDE 1000 ML: 9 INJECTION, SOLUTION INTRAVENOUS at 12:25

## 2019-01-01 RX ADMIN — ONDANSETRON 8 MG: 2 INJECTION INTRAMUSCULAR; INTRAVENOUS at 11:53

## 2019-01-01 RX ADMIN — SUCCINYLCHOLINE CHLORIDE 100 MG: 20 INJECTION, SOLUTION INTRAMUSCULAR; INTRAVENOUS at 11:35

## 2019-01-01 RX ADMIN — SODIUM CHLORIDE 2604 ML: 9 INJECTION, SOLUTION INTRAVENOUS at 13:04

## 2019-01-01 RX ADMIN — LEVETIRACETAM 1000 MG: 10 INJECTION INTRAVENOUS at 13:04

## 2019-01-01 RX ADMIN — MIDAZOLAM 2 MG/HR: 5 INJECTION INTRAMUSCULAR; INTRAVENOUS at 12:52

## 2019-01-01 RX ADMIN — MIDAZOLAM HYDROCHLORIDE 5 MG: 2 INJECTION, SOLUTION INTRAMUSCULAR; INTRAVENOUS at 12:35

## 2019-01-01 RX ADMIN — ETOMIDATE 30 MG: 20 INJECTION, SOLUTION INTRAVENOUS at 11:34

## 2019-01-01 RX ADMIN — CEFEPIME HYDROCHLORIDE 2 G: 2 INJECTION, POWDER, FOR SOLUTION INTRAVENOUS at 13:21

## 2019-01-01 RX ADMIN — PROPOFOL 35 MCG/KG/MIN: 10 INJECTION, EMULSION INTRAVENOUS at 11:42

## 2019-01-01 RX ADMIN — MIDAZOLAM HYDROCHLORIDE 5 MG: 2 INJECTION, SOLUTION INTRAMUSCULAR; INTRAVENOUS at 11:45

## 2019-01-01 ASSESSMENT — PULMONARY FUNCTION TESTS: PIF_VALUE: 19

## 2019-01-16 PROBLEM — I34.0 NON-RHEUMATIC MITRAL REGURGITATION: Status: ACTIVE | Noted: 2019-01-01

## 2019-07-23 NOTE — TELEPHONE ENCOUNTER
Can continue 20mg novolog prior to meals twice daily routinely and can adjust dose upwards based on ssi recommendations, if running high

## 2019-07-23 NOTE — TELEPHONE ENCOUNTER
CALLED AND SPOKE TO BILLY AT [facility] AND ADVISED ON DR. Eugene Palumbo NOTE.  SC

## 2019-08-01 NOTE — TELEPHONE ENCOUNTER
THIS CAN WAIT UNTIL DR CAMARILLO' RETURNS NEXT WEEK.   Yessica Keane!! [address] [employer] [facility]

## 2025-07-15 NOTE — TELEPHONE ENCOUNTER
Pt wife calling stating [employer] left msg for pt stating they have been trying to reach [employer] to get more information on the Lisinopril RX that was sent to them. Wife states they left a fax number of [phone] to fax info but she didn't know what info they needed. Pt is out of medication and needs this refilled ASAP.  Pls call to advise Thank you None